# Patient Record
Sex: MALE | Race: WHITE | NOT HISPANIC OR LATINO
[De-identification: names, ages, dates, MRNs, and addresses within clinical notes are randomized per-mention and may not be internally consistent; named-entity substitution may affect disease eponyms.]

---

## 2023-03-21 ENCOUNTER — NON-APPOINTMENT (OUTPATIENT)
Age: 73
End: 2023-03-21

## 2023-03-21 ENCOUNTER — APPOINTMENT (OUTPATIENT)
Dept: INTERNAL MEDICINE | Facility: CLINIC | Age: 73
End: 2023-03-21
Payer: MEDICARE

## 2023-03-21 VITALS — WEIGHT: 195 LBS | HEIGHT: 69 IN | BODY MASS INDEX: 28.88 KG/M2

## 2023-03-21 VITALS
DIASTOLIC BLOOD PRESSURE: 100 MMHG | OXYGEN SATURATION: 98 % | RESPIRATION RATE: 14 BRPM | HEART RATE: 82 BPM | SYSTOLIC BLOOD PRESSURE: 180 MMHG | TEMPERATURE: 98.1 F

## 2023-03-21 DIAGNOSIS — R06.00 DYSPNEA, UNSPECIFIED: ICD-10-CM

## 2023-03-21 PROCEDURE — 99204 OFFICE O/P NEW MOD 45 MIN: CPT | Mod: 25

## 2023-03-21 PROCEDURE — 93000 ELECTROCARDIOGRAM COMPLETE: CPT | Mod: 59

## 2023-03-21 NOTE — PHYSICAL EXAM
[No Acute Distress] : no acute distress [Well Nourished] : well nourished [Normal Outer Ear/Nose] : the outer ears and nose were normal in appearance [No JVD] : no jugular venous distention [No Respiratory Distress] : no respiratory distress  [No Accessory Muscle Use] : no accessory muscle use [Normal Gait] : normal gait [Normal] : affect was normal and insight and judgment were intact [de-identified] : reduced breath sounds on bilateral lower lung fields auscultated posteriorly [de-identified] : 2+ pitting edema bilateral lower extremities

## 2023-03-21 NOTE — HISTORY OF PRESENT ILLNESS
[FreeTextEntry8] : ROSMERY MOORE is a 72 year old male who presents for new patient focused evaluation, c/o shortness of breath.\par He is accompanied by his wife. \par His last evaluation with a physician was ~ 10  yrs ago, but states he has meds filled from someone (her prefers not to disclose) for his chronic conditions. Last labs ~10 yrs ago.\par H/o Diabetes Mellitus 2, diagnosed ~ 20 yrs ago, on Glyburide/Metformin 5/500 2 pills daily.\par H/o Anxiety--on Sertraline 50 mg daily and Wellbutrin ? dose daily\par Over the past 2-3 weeks has had progressive shortness of breath--even walking across the room gets out of breath. In the same time period he also notes his legs have both become swollen/heavy and can no longer lie on his back (works as a , can no longer lie on his back for work). He denies symptoms of chest pain and has no apparent h/o heart disease. \par Remote h/o smoking--1-2 ppd x 20 yrs, quit 30 yrs ago.

## 2023-03-21 NOTE — PLAN
[FreeTextEntry1] : Dyspnea on exertion, abnormal EKG, Hypertensive urgency: exam and details as above, counseled patient that I am concerned he is having an acute coronary syndrome and advised him on urgent ER evaluation. He declines ambulance. Provided him with letter documenting finding of exam and recommendation for ER visit; his wife will drive him directly from here to [facility] (his preference).\par \par Advised him once discharged needs to f/u with me.

## 2023-03-21 NOTE — REVIEW OF SYSTEMS
[Fatigue] : fatigue [Postnasal Drip] : postnasal drip [Lower Ext Edema] : lower extremity edema [Orthopena] : orthopnea [Paroxysmal Nocturnal Dyspnea] : paroxysmal nocturnal dyspnea [Shortness Of Breath] : shortness of breath [Dyspnea on Exertion] : dyspnea on exertion [Anxiety] : anxiety [Negative] : Genitourinary [Fever] : no fever [Chills] : no chills [Earache] : no earache [Nosebleeds] : no nosebleeds [Sore Throat] : no sore throat [Hoarseness] : no hoarseness [Chest Pain] : no chest pain [Palpitations] : no palpitations [Claudication] : no  leg claudication [Wheezing] : no wheezing [Headache] : no headache [Unsteady Walk] : no ataxia [Suicidal] : not suicidal [Insomnia] : no insomnia [Depression] : no depression [FreeTextEntry4] : +runny nose  [FreeTextEntry6] : cough at night from "mucous"

## 2023-04-11 ENCOUNTER — APPOINTMENT (OUTPATIENT)
Dept: INTERNAL MEDICINE | Facility: CLINIC | Age: 73
End: 2023-04-11
Payer: MEDICARE

## 2023-04-11 VITALS — DIASTOLIC BLOOD PRESSURE: 70 MMHG | WEIGHT: 175 LBS | BODY MASS INDEX: 25.84 KG/M2 | SYSTOLIC BLOOD PRESSURE: 120 MMHG

## 2023-04-11 DIAGNOSIS — M79.89 OTHER SPECIFIED SOFT TISSUE DISORDERS: ICD-10-CM

## 2023-04-11 DIAGNOSIS — I16.0 HYPERTENSIVE URGENCY: ICD-10-CM

## 2023-04-11 DIAGNOSIS — Z86.79 PERSONAL HISTORY OF OTHER DISEASES OF THE CIRCULATORY SYSTEM: ICD-10-CM

## 2023-04-11 PROCEDURE — 99214 OFFICE O/P EST MOD 30 MIN: CPT | Mod: 25

## 2023-04-11 RX ORDER — BUPROPION HYDROCHLORIDE 100 MG/1
100 TABLET, FILM COATED ORAL
Refills: 0 | Status: DISCONTINUED | COMMUNITY
Start: 2023-03-21 | End: 2023-04-11

## 2023-04-11 RX ORDER — GLYBURIDE AND METFORMIN HYDROCHLORIDE 5; 500 MG/1; MG/1
5-500 TABLET, FILM COATED ORAL
Qty: 180 | Refills: 1 | Status: DISCONTINUED | COMMUNITY
Start: 2023-03-21 | End: 2023-04-11

## 2023-04-11 RX ORDER — SERTRALINE HYDROCHLORIDE 50 MG/1
50 TABLET, FILM COATED ORAL
Refills: 0 | Status: DISCONTINUED | COMMUNITY
Start: 2023-03-21 | End: 2023-04-11

## 2023-04-11 NOTE — HISTORY OF PRESENT ILLNESS
[Post-hospitalization from ___ Hospital] : Post-hospitalization from [unfilled] Hospital [Patient Contacted By: ____] : and contacted by [unfilled] [FreeTextEntry2] : \par Pt was hospitalized for HTN emergency, CHF \par reports angiogram done needed no intervention \par has f/u with cardiologist Dr. Cooper Mireles coming up \par on new meds\par states baseline weight is 176 lbs - weighing himself daily - watching his diet \par \par \par yesterday saw his endocrinologist Dr. Jamal Morales who kaya blood work \par is on Tradjenta\par \par states mood is good - off sertraline and wellbutrin x 2-3 weeks \par \par states had R. angiogram with R. groin hematoma slowly resolving\par \par

## 2023-04-11 NOTE — PHYSICAL EXAM
[No Acute Distress] : no acute distress [Well Nourished] : well nourished [No Accessory Muscle Use] : no accessory muscle use [Clear to Auscultation] : lungs were clear to auscultation bilaterally [Regular Rhythm] : with a regular rhythm [Normal S1, S2] : normal S1 and S2 [No Murmur] : no murmur heard [No Edema] : there was no peripheral edema [Soft] : abdomen soft [Non Tender] : non-tender [Non-distended] : non-distended [No Masses] : no abdominal mass palpated [No HSM] : no HSM [Normal Bowel Sounds] : normal bowel sounds [de-identified] : +abdominal hernia, reducible [de-identified] : resolving R. groin hematoma with echmyosses

## 2023-04-14 ENCOUNTER — APPOINTMENT (OUTPATIENT)
Dept: CARDIOLOGY | Facility: CLINIC | Age: 73
End: 2023-04-14
Payer: MEDICARE

## 2023-04-14 ENCOUNTER — NON-APPOINTMENT (OUTPATIENT)
Age: 73
End: 2023-04-14

## 2023-04-14 VITALS
DIASTOLIC BLOOD PRESSURE: 69 MMHG | HEART RATE: 53 BPM | RESPIRATION RATE: 17 BRPM | WEIGHT: 175 LBS | BODY MASS INDEX: 25.92 KG/M2 | SYSTOLIC BLOOD PRESSURE: 122 MMHG | HEIGHT: 69 IN | OXYGEN SATURATION: 100 %

## 2023-04-14 PROCEDURE — 99204 OFFICE O/P NEW MOD 45 MIN: CPT

## 2023-04-14 PROCEDURE — 93000 ELECTROCARDIOGRAM COMPLETE: CPT

## 2023-04-14 NOTE — REASON FOR VISIT
[Symptom and Test Evaluation] : symptom and test evaluation [Cardiac Failure] : cardiac failure [Other: ____] : [unfilled] [FreeTextEntry3] : Ip

## 2023-04-14 NOTE — ASSESSMENT
[FreeTextEntry1] : New onset congestive heart failure in patient with diabetes, reported nonobstructive CAD.

## 2023-04-14 NOTE — DISCUSSION/SUMMARY
[Patient] : the patient [Risks] : risks [Benefits] : benefits [Alternatives] : alternatives [With Me] : with me [___ Week(s)] : in [unfilled] week(s) [FreeTextEntry1] : Reviewed and discussed with him in detail low-sodium diet Daily weights at home with extra Bumex dosing as needed weight gain more than 3 pounds in a day.  Referral to cardiac rehab.  Blood testing requested.  Records from [facility] requested including echo and angiography results.  Follow-up in 4 to 6 weeks.  Multiple questions addressed with patient. [EKG obtained to assist in diagnosis and management of assessed problem(s)] : EKG obtained to assist in diagnosis and management of assessed problem(s)

## 2023-04-14 NOTE — HISTORY OF PRESENT ILLNESS
[FreeTextEntry1] : History was obtained from patient.  This gentleman indicates he has had diabetes for about 10 years and no prior history of heart disease.  He indicates he saw his physician with about 2 weeks of increasing dyspnea shortness of breath at night and leg swelling.  He was hospitalized at [facility].  Reports having an angiogram with no intervention and believes that his ejection fraction was 40%.\par \par He was discharged on multiple medications which were reviewed.  He is lost about 20 pounds of weight since the hospital stay.  He feels well at this time without chest pain shortness of breath palpitation or edema.  He is now on a strict low-sodium diet.\par \par He gives no history of myocardial infarction angina recent viral infection.  He stopped drinking and smoking 40 years ago.  He is active automobile repair business.

## 2023-04-14 NOTE — PHYSICAL EXAM
[Normal] : clear lung fields, good air entry, no respiratory distress [Soft] : abdomen soft [Non Tender] : non-tender [Edema ___] : edema [unfilled] [No Rash] : no rash [de-identified] : +hernia

## 2023-04-25 ENCOUNTER — RX RENEWAL (OUTPATIENT)
Age: 73
End: 2023-04-25

## 2023-05-16 ENCOUNTER — RX RENEWAL (OUTPATIENT)
Age: 73
End: 2023-05-16

## 2023-05-23 ENCOUNTER — LABORATORY RESULT (OUTPATIENT)
Age: 73
End: 2023-05-23

## 2023-05-23 ENCOUNTER — APPOINTMENT (OUTPATIENT)
Dept: INTERNAL MEDICINE | Facility: CLINIC | Age: 73
End: 2023-05-23
Payer: MEDICARE

## 2023-05-23 VITALS — HEIGHT: 69 IN | WEIGHT: 180 LBS | BODY MASS INDEX: 26.66 KG/M2

## 2023-05-23 VITALS
TEMPERATURE: 98.1 F | RESPIRATION RATE: 14 BRPM | OXYGEN SATURATION: 97 % | HEART RATE: 66 BPM | DIASTOLIC BLOOD PRESSURE: 78 MMHG | SYSTOLIC BLOOD PRESSURE: 138 MMHG

## 2023-05-23 DIAGNOSIS — Z00.00 ENCOUNTER FOR GENERAL ADULT MEDICAL EXAMINATION W/OUT ABNORMAL FINDINGS: ICD-10-CM

## 2023-05-23 DIAGNOSIS — E78.5 HYPERLIPIDEMIA, UNSPECIFIED: ICD-10-CM

## 2023-05-23 DIAGNOSIS — F41.9 ANXIETY DISORDER, UNSPECIFIED: ICD-10-CM

## 2023-05-23 DIAGNOSIS — K46.9 UNSPECIFIED ABDOMINAL HERNIA W/OUT OBSTRUCTION OR GANGRENE: ICD-10-CM

## 2023-05-23 PROCEDURE — G0439: CPT

## 2023-05-23 PROCEDURE — G0444 DEPRESSION SCREEN ANNUAL: CPT | Mod: 59

## 2023-05-23 PROCEDURE — 36415 COLL VENOUS BLD VENIPUNCTURE: CPT

## 2023-05-23 PROCEDURE — 99212 OFFICE O/P EST SF 10 MIN: CPT | Mod: 25

## 2023-05-23 RX ORDER — ALPRAZOLAM 0.5 MG/1
0.5 TABLET ORAL
Qty: 10 | Refills: 0 | Status: ACTIVE | COMMUNITY
Start: 2023-05-23 | End: 1900-01-01

## 2023-05-23 NOTE — PHYSICAL EXAM
[No Axillary Lymphadenopathy] : no axillary lymphadenopathy [Soft] : abdomen soft [No Masses] : no abdominal mass palpated [No HSM] : no HSM [Normal Bowel Sounds] : normal bowel sounds [Declined Rectal Exam] : declined rectal exam [Normal] : affect was normal and insight and judgment were intact [de-identified] : +abdominal hernia--nontender, not fully reducible/+BS.  [de-identified] : no bladder fullness, no testicular masses [de-identified] : scattered hyperpigmented patches,moles on back,chest, arms.

## 2023-05-23 NOTE — HISTORY OF PRESENT ILLNESS
[FreeTextEntry1] : "here for my physical" [de-identified] : ROSMERY MOORE is a 72 year old male who presents for annual comprehensive exam.\par Last seen in March for new patient anisha, was sent to [facility] due to concern for CHF/hypertensive urgency.\par Since then has been following regularly with cardiology (Dr. Mireles), and Endocrinology (Dr. Jamal Morales). Has completely cut out salt, alcohol, and is maintaining low carb diet. Complaint with all prescribed meds.\par Diabetes--on Tradjenta 5 mg, Jardiance 10 mg; fingersticks consistently in 110s per patient. \par CHF--weight has been stable, keeps very active at work (, 5.5 days/week), denies any physical limitations at present. To start cardaic rehab soon.\par h/o Anxiety, sometimes becomes irritable/anxious, has Rx for Xanax from prior PCP, takes ~8-10x/year, requesting renewals. Denies any h/o drug abuse or dependency.

## 2023-05-23 NOTE — PLAN
[FreeTextEntry1] : Labs drawn in office\par #CHF: counseled on diet, exercise, encouraged to schedule cardiac rehab as advised by cardiologist. Meds reconciled, on Entresto, Amlodipine, Lipitor, Coreg, Bumetanide, weight stable. Check lipids, CMPTo f/u with Dr. Mireles.\par #DM II with CKD: labs drawn, on Tradjenta, Jardiance, f/u endo. UTD eye exam, foot exam\par #Anxiety: Xanax renewed, advised will provide once a year Rx, if requests escalating dose, advised needs to see psychiatrist.\par #Abdominal hernia: surgical evaluation--provided contact.\par \par Colonoscopy: referred\par Vaccines: per patient completed SHingrix, Pneumonia, COVID19 with boosters and seasonal Flu--advised to send records.\par \par \par

## 2023-05-23 NOTE — HEALTH RISK ASSESSMENT
[Very Good] : ~his/her~  mood as very good [No] : In the past 12 months have you used drugs other than those required for medical reasons? No [No falls in past year] : Patient reported no falls in the past year [0] : 2) Feeling down, depressed, or hopeless: Not at all (0) [PHQ-2 Negative - No further assessment needed] : PHQ-2 Negative - No further assessment needed [de-identified] : Cards, Endo, ophthalmologist (Dr. Pérez) [de-identified] : walks daily/lifts heavy items at work daily [de-identified] : low salt, low carb, low saturated fat [[id number]] : 0 [HIV test declined] : HIV test declined [Hepatitis C test declined] : Hepatitis C test declined [Change in mental status noted] : No change in mental status noted [Language] : denies difficulty with language [Behavior] : denies difficulty with behavior [Learning/Retaining New Information] : denies difficulty learning/retaining new information [Handling Complex Tasks] : denies difficulty handling complex tasks [Reasoning] : denies difficulty with reasoning [Spatial Ability and Orientation] : denies difficulty with spatial ability and orientation [None] : None [With Family] : lives with family [] :  [Employed] : employed [# Of Children ___] : has [unfilled] children [Feels Safe at Home] : Feels safe at home [High Risk Behavior] : no high risk behavior [Fully functional (bathing, dressing, toileting, transferring, walking, feeding)] : Fully functional (bathing, dressing, toileting, transferring, walking, feeding) [Fully functional (using the telephone, shopping, preparing meals, housekeeping, doing laundry, using] : Fully functional and needs no help or supervision to perform IADLs (using the telephone, shopping, preparing meals, housekeeping, doing laundry, using transportation, managing medications and managing finances) [Reports changes in hearing] : Reports no changes in hearing [Reports changes in vision] : Reports no changes in vision [Reports normal functional visual acuity (ie: able to read med bottle)] : Reports normal functional visual acuity [Reports changes in dental health] : Reports no changes in dental health [Smoke Detector] : smoke detector [Carbon Monoxide Detector] : carbon monoxide detector [Safety elements used in home] : safety elements used in home [Seat Belt] :  uses seat belt [Sunscreen] : uses sunscreen [TB Exposure] : is not being exposed to tuberculosis [ColonoscopyComments] : overdue--referral to GI provided [FreeTextEntry2] :  [Former] : Former [20 or more] : 20 or more [> 15 Years] : > 15 Years [de-identified] : quit 1 ppd x 20 yrs, quit 35 yrs ago

## 2023-05-26 ENCOUNTER — NON-APPOINTMENT (OUTPATIENT)
Age: 73
End: 2023-05-26

## 2023-05-26 ENCOUNTER — APPOINTMENT (OUTPATIENT)
Dept: CARDIOLOGY | Facility: CLINIC | Age: 73
End: 2023-05-26
Payer: MEDICARE

## 2023-05-26 VITALS
RESPIRATION RATE: 19 BRPM | HEIGHT: 69 IN | HEART RATE: 55 BPM | OXYGEN SATURATION: 100 % | TEMPERATURE: 94.3 F | DIASTOLIC BLOOD PRESSURE: 78 MMHG | SYSTOLIC BLOOD PRESSURE: 159 MMHG | BODY MASS INDEX: 27.25 KG/M2 | WEIGHT: 184 LBS

## 2023-05-26 DIAGNOSIS — I25.10 ATHEROSCLEROTIC HEART DISEASE OF NATIVE CORONARY ARTERY W/OUT ANGINA PECTORIS: ICD-10-CM

## 2023-05-26 PROCEDURE — 99214 OFFICE O/P EST MOD 30 MIN: CPT

## 2023-05-26 PROCEDURE — 93000 ELECTROCARDIOGRAM COMPLETE: CPT

## 2023-05-26 RX ORDER — BUMETANIDE 0.5 MG/1
0.5 TABLET ORAL
Qty: 180 | Refills: 1 | Status: DISCONTINUED | COMMUNITY
Start: 2023-04-11 | End: 2023-05-26

## 2023-05-26 NOTE — DISCUSSION/SUMMARY
[Patient] : the patient [Risks] : risks [Benefits] : benefits [Alternatives] : alternatives [With Me] : with me [___ Week(s)] : in [unfilled] week(s) [FreeTextEntry1] : Patient counseled verbally and in writing regarding low sodium diet, handout given.\par Recommended diet and weight loss, regular exericse.\par Daily weights with extra Bumex for weight gain more than 3 pounds 1 day 5 pounds 1 week.\par \par Reduction of daily Bumex dose given rising creatinine.  Lab work ordered prior to next visit repeat echo at next visit to reassess LV function.  Questions addressed with patient.

## 2023-05-26 NOTE — PHYSICAL EXAM
[Normal] : clear lung fields, good air entry, no respiratory distress [Soft] : abdomen soft [Non Tender] : non-tender [Edema ___] : edema [unfilled] [No Rash] : no rash [de-identified] : +hernia

## 2023-05-26 NOTE — CARDIOLOGY SUMMARY
[de-identified] : April 14, 2023 sinus rhythm poor R progression abnormal ST-T appearance\par May 26, 2023 sinus rhythm poor progression abnormal ST-T appearance [de-identified] : April 2023 EF 30% [de-identified] : March 2023 severe disease of dominant RCA otherwise nonobstructive disease

## 2023-05-26 NOTE — ASSESSMENT
[FreeTextEntry1] : New onset congestive heart failure in patient with diabetes, reported nonobstructive CAD. \par Severe LV dysfunction by hospital echo.  CKD.  Diabetes.  Hypertension.

## 2023-05-26 NOTE — REASON FOR VISIT
[Symptom and Test Evaluation] : symptom and test evaluation [Cardiac Failure] : cardiac failure [FreeTextEntry3] : Ip

## 2023-05-26 NOTE — HISTORY OF PRESENT ILLNESS
[FreeTextEntry1] : Follow-up for heart failure with reduced ejection fraction nonischemic cardiomyopathy.

## 2023-06-13 RX ORDER — LINAGLIPTIN 5 MG/1
5 TABLET, FILM COATED ORAL
Qty: 90 | Refills: 1 | Status: ACTIVE | COMMUNITY
Start: 2023-04-11

## 2023-07-28 ENCOUNTER — APPOINTMENT (OUTPATIENT)
Dept: CARDIOLOGY | Facility: CLINIC | Age: 73
End: 2023-07-28
Payer: MEDICARE

## 2023-07-28 ENCOUNTER — NON-APPOINTMENT (OUTPATIENT)
Age: 73
End: 2023-07-28

## 2023-07-28 VITALS
OXYGEN SATURATION: 96 % | SYSTOLIC BLOOD PRESSURE: 136 MMHG | DIASTOLIC BLOOD PRESSURE: 79 MMHG | WEIGHT: 181 LBS | BODY MASS INDEX: 26.81 KG/M2 | HEIGHT: 69 IN | HEART RATE: 56 BPM

## 2023-07-28 DIAGNOSIS — Z78.9 OTHER SPECIFIED HEALTH STATUS: ICD-10-CM

## 2023-07-28 PROCEDURE — 99214 OFFICE O/P EST MOD 30 MIN: CPT

## 2023-07-28 PROCEDURE — 93000 ELECTROCARDIOGRAM COMPLETE: CPT

## 2023-07-28 PROCEDURE — 93306 TTE W/DOPPLER COMPLETE: CPT

## 2023-07-28 RX ORDER — SACUBITRIL AND VALSARTAN 49; 51 MG/1; MG/1
49-51 TABLET, FILM COATED ORAL
Qty: 180 | Refills: 3 | Status: DISCONTINUED | COMMUNITY
Start: 2023-04-11 | End: 2023-07-28

## 2023-07-28 NOTE — PHYSICAL EXAM
[Normal] : clear lung fields, good air entry, no respiratory distress [Soft] : abdomen soft [Non Tender] : non-tender [No Edema] : no edema [Edema ___] : edema [unfilled] [No Rash] : no rash [de-identified] : +hernia

## 2023-07-28 NOTE — DISCUSSION/SUMMARY
[Patient] : the patient [Risks] : risks [Benefits] : benefits [Alternatives] : alternatives [With Me] : with me [___ Week(s)] : in [unfilled] week(s) [FreeTextEntry1] : Will DC daily Bumex and use only as needed.  Dosing of Entresto will be reduced as well.  Nephrology evaluation recommended repeat blood testing ordered discussed in detail with patient.  Follow-up 4 to 6 weeks [EKG obtained to assist in diagnosis and management of assessed problem(s)] : EKG obtained to assist in diagnosis and management of assessed problem(s)

## 2023-07-28 NOTE — HISTORY OF PRESENT ILLNESS
[FreeTextEntry1] : Follow-up for heart failure with reduced ejection fraction nonischemic cardiomyopathy.\par \par No chest pain palpitations or edema.  Some fatigue after walking 3 blocks.\par \par

## 2023-07-28 NOTE — ASSESSMENT
[FreeTextEntry1] : Substantial improvement with normalization of LV function today by echo.  Heart failure with improved ejection fraction.  Chronic kidney disease.  Diabetes.

## 2023-07-28 NOTE — CARDIOLOGY SUMMARY
[de-identified] : April 14, 2023 sinus rhythm poor R progression abnormal ST-T appearance\par May 26, 2023 sinus rhythm poor progression abnormal ST-T appearance\par July 28, 2023 sinus rhythm nonspecific ST-T change [de-identified] : April 2023 EF 30%\par July 28, 2023 normal LVEF mild AI [de-identified] : March 2023 severe disease of dominant RCA otherwise nonobstructive disease

## 2023-08-02 ENCOUNTER — APPOINTMENT (OUTPATIENT)
Dept: NEPHROLOGY | Facility: CLINIC | Age: 73
End: 2023-08-02
Payer: MEDICARE

## 2023-08-02 VITALS
SYSTOLIC BLOOD PRESSURE: 136 MMHG | DIASTOLIC BLOOD PRESSURE: 70 MMHG | HEIGHT: 69 IN | BODY MASS INDEX: 25.48 KG/M2 | HEART RATE: 60 BPM | WEIGHT: 172 LBS

## 2023-08-02 DIAGNOSIS — Z83.3 FAMILY HISTORY OF DIABETES MELLITUS: ICD-10-CM

## 2023-08-02 DIAGNOSIS — R80.8 OTHER PROTEINURIA: ICD-10-CM

## 2023-08-02 DIAGNOSIS — N18.30 CHRONIC KIDNEY DISEASE, STAGE 3 UNSPECIFIED: ICD-10-CM

## 2023-08-02 DIAGNOSIS — I50.20 UNSPECIFIED SYSTOLIC (CONGESTIVE) HEART FAILURE: ICD-10-CM

## 2023-08-02 PROCEDURE — 99205 OFFICE O/P NEW HI 60 MIN: CPT

## 2023-08-02 NOTE — HISTORY OF PRESENT ILLNESS
[FreeTextEntry1] : 72-year-old male with history of diabetes, hypertension, systolic CHF, here to establish renal care for CRISPIN on CKD 3B. Patient had been admitted to [facility] from March 21 to March 24.  Found to have decompensated systolic CHF.  Diuresis, had angiogram done with no intervention, had renal sonogram done and creatinine was 2.0 at that time. Ultrasound from March 22 showed right kidney 10.9 cm, left kidney 11 cm, distended bladder, trace upper pole fullness with no hydro. Patient had followed up with cardiologist Dr. Cooper Mireles.  Had been on Bumex as well as Entresto along with Jardiance.  Creatinine in May shows 2.9.  Repeat echocardiogram showed improvement in systolic ejection fraction.  Entresto was decreased as well as taken off bumetanide. Patient overall has less edema.  Reportedly lost about 20 pounds. He is also eating less.  He notices edema progressive during the daytime hours.  In the morning it is normal.  He is on amlodipine 10 mg daily. He was taking Aleve regularly for his back pain.  Now is taking it twice a week. Diet: Drinks dark soda. Eating less red meat and more chicken, vegetables, and fruits. No family history of kidney disease. Follows with Dr. Jamal Morales for his diabetes which has been relatively controlled.  He has retinopathy from diabetes.  Denies neuropathy. He has some symptoms of long COVID with no taste and no smell.  He first had COVID in the beginning of the pandemic and 2 subsequent times since. He is  with children.  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

## 2023-08-02 NOTE — ASSESSMENT
[FreeTextEntry1] : 72-year-old male with history of diabetes, hypertension, systolic CHF, CKD stage IIIb with CRISPIN. Presumably patient has history of CKD stage IIIb.  Possibly in the setting of diabetic and hypertensive nephropathy, with component of cardiorenal syndrome from decompensated CHF. Creatinine in the 2 range in March at [facility]. More recently in May creatinine up to 2.9.  Patient with recent med adjustments which I agree with including decreasing the Entresto dose and stopping the bumetanide for now.  He was asked by Dr. Mireles to monitor his weights and take as needed. Patient now is extremely salt conscious and does not add any more salt in foods.  He has changed his diet as well. We will repeat his renal function today for baseline. We will also screen for hepatitis B, C, free light chains. Proteinuria: Trend albumin creatinine ratio.  Proteinuria likely in the setting of diabetic nephropathy. Cardiorenal syndrome: Recent echo shows improvement in his ejection fraction.  On decreased Entresto dosing.  Continue to monitor with cardiology. Bone metabolism: We will check vitamin D and PTH intact.  Likely will need to go back on vitamin D therapy. Anemia: He is anemic.  We will check iron studies.  We will check free light chains. Patient was advised to avoid chronic NSAIDs. Diabetes: Check A1c.  And trend proteinuria on Jardiance. All questions were answered.  Follow-up in 3 months time.  60 minutes have been spent reviewing medical history, reviewing patient's medical chart including prior lab results, imaging reports, interviewing and examining patient, formulating a detailed plan with patient, documentation and coordinating care with referring physician.

## 2023-08-02 NOTE — PHYSICAL EXAM
[General Appearance - Alert] : alert [General Appearance - In No Acute Distress] : in no acute distress [Sclera] : the sclera and conjunctiva were normal [Outer Ear] : the ears and nose were normal in appearance [Neck Appearance] : the appearance of the neck was normal [Neck Cervical Mass (___cm)] : no neck mass was observed [Respiration, Rhythm And Depth] : normal respiratory rhythm and effort [Auscultation Breath Sounds / Voice Sounds] : lungs were clear to auscultation bilaterally [Heart Rate And Rhythm] : heart rate was normal and rhythm regular [Heart Sounds] : normal S1 and S2 [Heart Sounds Pericardial Friction Rub] : no pericardial rub [Arterial Pulses Carotid] : carotid pulses were normal with no bruits [Bowel Sounds] : normal bowel sounds [Abdomen Soft] : soft [Abdomen Tenderness] : non-tender [Involuntary Movements] : no involuntary movements were seen [] : no rash [No Focal Deficits] : no focal deficits [Oriented To Time, Place, And Person] : oriented to person, place, and time [Affect] : the affect was normal [Mood] : the mood was normal [FreeTextEntry1] : 1+ edema of ankles worsened during the day.

## 2023-08-02 NOTE — CONSULT LETTER
[Dear  ___] : Dear  [unfilled], [Consult Letter:] : I had the pleasure of evaluating your patient, [unfilled]. [( Thank you for referring [unfilled] for consultation for _____ )] : Thank you for referring [unfilled] for consultation for [unfilled] [Please see my note below.] : Please see my note below. [Consult Closing:] : Thank you very much for allowing me to participate in the care of this patient.  If you have any questions, please do not hesitate to contact me. [Sincerely,] : Sincerely, [DrMaryellen  ___] : Dr. CAMPBELL [DrMaryellen ___] : Dr. CAMPBELL [FreeTextEntry3] : Charlotte Bustillos MD  [facility] Division of Nephrology and Hypertension

## 2023-08-03 DIAGNOSIS — E87.5 HYPERKALEMIA: ICD-10-CM

## 2023-08-03 RX ORDER — SODIUM ZIRCONIUM CYCLOSILICATE 10 G/10G
10 POWDER, FOR SUSPENSION ORAL
Qty: 30 | Refills: 11 | Status: ACTIVE | COMMUNITY
Start: 2023-08-03 | End: 1900-01-01

## 2023-08-07 LAB
25(OH)D3 SERPL-MCNC: 16.4 NG/ML
ALBUMIN SERPL ELPH-MCNC: 3.9 G/DL
ALP BLD-CCNC: 100 U/L
ALT SERPL-CCNC: 11 U/L
ANION GAP SERPL CALC-SCNC: 9 MMOL/L
APPEARANCE: CLEAR
AST SERPL-CCNC: 11 U/L
BACTERIA: NEGATIVE /HPF
BILIRUB SERPL-MCNC: 0.5 MG/DL
BILIRUBIN URINE: NEGATIVE
BLOOD URINE: NEGATIVE
BUN SERPL-MCNC: 65 MG/DL
CALCIUM SERPL-MCNC: 9 MG/DL
CALCIUM SERPL-MCNC: 9 MG/DL
CAST: 1 /LPF
CHLORIDE SERPL-SCNC: 113 MMOL/L
CO2 SERPL-SCNC: 19 MMOL/L
COLOR: YELLOW
CREAT SERPL-MCNC: 3.18 MG/DL
CREAT SPEC-SCNC: 178 MG/DL
DEPRECATED KAPPA LC FREE/LAMBDA SER: 1.91 RATIO
EGFR: 20 ML/MIN/1.73M2
EPITHELIAL CELLS: 0 /HPF
ESTIMATED AVERAGE GLUCOSE: 126 MG/DL
FERRITIN SERPL-MCNC: 310 NG/ML
FOLATE SERPL-MCNC: 11.8 NG/ML
GLUCOSE QUALITATIVE U: 250 MG/DL
GLUCOSE SERPL-MCNC: 104 MG/DL
HBA1C MFR BLD HPLC: 6 %
HBV CORE IGG+IGM SER QL: NONREACTIVE
HBV SURFACE AB SER QL: NONREACTIVE
HBV SURFACE AG SER QL: NONREACTIVE
HCV AB SER QL: NONREACTIVE
HCV S/CO RATIO: 0.1 S/CO
IRON SATN MFR SERPL: 26 %
IRON SERPL-MCNC: 57 UG/DL
KAPPA LC CSF-MCNC: 5.32 MG/DL
KAPPA LC SERPL-MCNC: 10.17 MG/DL
KETONES URINE: NEGATIVE MG/DL
LEUKOCYTE ESTERASE URINE: NEGATIVE
M PROTEIN SPEC IFE-MCNC: NORMAL
MAGNESIUM SERPL-MCNC: 2.4 MG/DL
MICROALBUMIN 24H UR DL<=1MG/L-MCNC: 83.8 MG/DL
MICROALBUMIN/CREAT 24H UR-RTO: 470 MG/G
MICROSCOPIC-UA: NORMAL
NITRITE URINE: NEGATIVE
PARATHYROID HORMONE INTACT: 109 PG/ML
PH URINE: 5.5
PHOSPHATE SERPL-MCNC: 4 MG/DL
POTASSIUM SERPL-SCNC: 6.3 MMOL/L
PROT SERPL-MCNC: 6.7 G/DL
PROTEIN URINE: 300 MG/DL
RED BLOOD CELLS URINE: 0 /HPF
SODIUM SERPL-SCNC: 141 MMOL/L
SPECIFIC GRAVITY URINE: 1.02
TIBC SERPL-MCNC: 214 UG/DL
UIBC SERPL-MCNC: 158 UG/DL
URATE SERPL-MCNC: 8.9 MG/DL
UROBILINOGEN URINE: 0.2 MG/DL
VIT B12 SERPL-MCNC: 564 PG/ML
WHITE BLOOD CELLS URINE: 0 /HPF

## 2023-08-08 LAB
ALBUMIN SERPL ELPH-MCNC: 3.7 G/DL
ANION GAP SERPL CALC-SCNC: 11 MMOL/L
BUN SERPL-MCNC: 48 MG/DL
CALCIUM SERPL-MCNC: 8.2 MG/DL
CHLORIDE SERPL-SCNC: 114 MMOL/L
CO2 SERPL-SCNC: 17 MMOL/L
CREAT SERPL-MCNC: 2.59 MG/DL
EGFR: 26 ML/MIN/1.73M2
GLUCOSE SERPL-MCNC: 127 MG/DL
PHOSPHATE SERPL-MCNC: 3.4 MG/DL
POTASSIUM SERPL-SCNC: 5 MMOL/L
SODIUM SERPL-SCNC: 143 MMOL/L

## 2023-08-08 RX ORDER — BUMETANIDE 0.5 MG/1
0.5 TABLET ORAL
Qty: 90 | Refills: 1 | Status: DISCONTINUED | COMMUNITY
Start: 2023-05-26 | End: 2023-08-08

## 2023-08-08 RX ORDER — SACUBITRIL AND VALSARTAN 24; 26 MG/1; MG/1
24-26 TABLET, FILM COATED ORAL
Qty: 180 | Refills: 2 | Status: DISCONTINUED | COMMUNITY
Start: 2023-07-28 | End: 2023-08-08

## 2023-08-31 ENCOUNTER — APPOINTMENT (OUTPATIENT)
Dept: CARDIOLOGY | Facility: CLINIC | Age: 73
End: 2023-08-31
Payer: MEDICARE

## 2023-08-31 ENCOUNTER — NON-APPOINTMENT (OUTPATIENT)
Age: 73
End: 2023-08-31

## 2023-08-31 VITALS
OXYGEN SATURATION: 97 % | WEIGHT: 184 LBS | HEIGHT: 69 IN | BODY MASS INDEX: 27.25 KG/M2 | TEMPERATURE: 94.2 F | HEART RATE: 60 BPM | RESPIRATION RATE: 18 BRPM | SYSTOLIC BLOOD PRESSURE: 160 MMHG | DIASTOLIC BLOOD PRESSURE: 90 MMHG

## 2023-08-31 PROCEDURE — 93000 ELECTROCARDIOGRAM COMPLETE: CPT

## 2023-08-31 PROCEDURE — 99214 OFFICE O/P EST MOD 30 MIN: CPT

## 2023-08-31 RX ORDER — ISOSORBIDE MONONITRATE 30 MG/1
30 TABLET, EXTENDED RELEASE ORAL
Qty: 90 | Refills: 3 | Status: ACTIVE | COMMUNITY
Start: 2023-08-31 | End: 1900-01-01

## 2023-08-31 NOTE — REASON FOR VISIT
[Symptom and Test Evaluation] : symptom and test evaluation [Cardiac Failure] : cardiac failure [Hypertension] : hypertension [FreeTextEntry3] : Ip

## 2023-08-31 NOTE — REVIEW OF SYSTEMS
[Weight Gain (___ Lbs)] : [unfilled] ~Ulb weight gain [Weight Loss (___ Lbs)] : no recent weight loss [Under Stress] : under stress

## 2023-08-31 NOTE — CARDIOLOGY SUMMARY
[de-identified] : April 14, 2023 sinus rhythm poor R progression abnormal ST-T appearance\par  May 26, 2023 sinus rhythm poor progression abnormal ST-T appearance [de-identified] : April 2023 EF 30% [de-identified] : March 2023 severe disease of dominant RCA otherwise nonobstructive disease

## 2023-08-31 NOTE — HISTORY OF PRESENT ILLNESS
[FreeTextEntry1] : Follow-up for heart failure with reduced ejection fraction nonischemic cardiomyopathy.  He has had issues with hyperkalemia and renal insufficiency followed and treated by Dr. Bustillos now.  He claims compliance with medicine but admits to eating more than he should have a lot of salty food.  He denies shortness of breath chest pain palpitations.

## 2023-08-31 NOTE — DISCUSSION/SUMMARY
[Patient] : the patient [Risks] : risks [Benefits] : benefits [Alternatives] : alternatives [With Me] : with me [___ Week(s)] : in [unfilled] week(s) [FreeTextEntry1] :  Loop diuretic until weight has come down to baseline and then use as needed weight gain more than 3 pounds 1 day 4 to 5 pounds in 1 week reviewed this with patient.  Initiate hydralazine and nitrates.  Repeat lab test before next visit in 3 weeks.  Renal follow-up recommended. [EKG obtained to assist in diagnosis and management of assessed problem(s)] : EKG obtained to assist in diagnosis and management of assessed problem(s)

## 2023-08-31 NOTE — PHYSICAL EXAM
[Normal] : clear lung fields, good air entry, no respiratory distress [Soft] : abdomen soft [Non Tender] : non-tender [Edema ___] : edema [unfilled] [No Rash] : no rash [de-identified] : +hernia

## 2023-08-31 NOTE — ASSESSMENT
[FreeTextEntry1] : Recent onset congestive heart failure in patient with diabetes, reported nonobstructive CAD.  Severe LV dysfunction by hospital echo with improved LV function in office echo..  CKD.  Diabetes.  Hypertension.  Hyperkalemia.  Heart failure regimen medications limited by his CKD and hyperkalemia

## 2023-09-29 ENCOUNTER — APPOINTMENT (OUTPATIENT)
Dept: CARDIOLOGY | Facility: CLINIC | Age: 73
End: 2023-09-29

## 2023-11-07 ENCOUNTER — APPOINTMENT (OUTPATIENT)
Dept: NEPHROLOGY | Facility: CLINIC | Age: 73
End: 2023-11-07
Payer: MEDICARE

## 2023-11-07 ENCOUNTER — LABORATORY RESULT (OUTPATIENT)
Age: 73
End: 2023-11-07

## 2023-11-07 VITALS
WEIGHT: 180 LBS | HEART RATE: 64 BPM | OXYGEN SATURATION: 97 % | TEMPERATURE: 98.7 F | HEIGHT: 69 IN | BODY MASS INDEX: 26.66 KG/M2 | SYSTOLIC BLOOD PRESSURE: 172 MMHG | DIASTOLIC BLOOD PRESSURE: 76 MMHG

## 2023-11-07 DIAGNOSIS — Z88.9 ALLERGY STATUS TO UNSPECIFIED DRUGS, MEDICAMENTS AND BIOLOGICAL SUBSTANCES: ICD-10-CM

## 2023-11-07 PROCEDURE — 99214 OFFICE O/P EST MOD 30 MIN: CPT

## 2023-11-09 LAB
ALBUMIN SERPL ELPH-MCNC: 3.5 G/DL
ALP BLD-CCNC: 117 U/L
ALT SERPL-CCNC: 14 U/L
ANION GAP SERPL CALC-SCNC: 14 MMOL/L
APPEARANCE: CLEAR
AST SERPL-CCNC: 13 U/L
BACTERIA: NEGATIVE /HPF
BASOPHILS # BLD AUTO: 0.03 K/UL
BASOPHILS NFR BLD AUTO: 0.6 %
BILIRUB SERPL-MCNC: 0.3 MG/DL
BILIRUBIN URINE: NEGATIVE
BLOOD URINE: ABNORMAL
BUN SERPL-MCNC: 56 MG/DL
C3 SERPL-MCNC: 114 MG/DL
C4 SERPL-MCNC: 34 MG/DL
CALCIUM SERPL-MCNC: 8.3 MG/DL
CAST: 1 /LPF
CHLORIDE SERPL-SCNC: 109 MMOL/L
CO2 SERPL-SCNC: 19 MMOL/L
COLOR: YELLOW
CREAT SERPL-MCNC: 3.24 MG/DL
CREAT SPEC-SCNC: 58 MG/DL
EGFR: 20 ML/MIN/1.73M2
EOSINOPHIL # BLD AUTO: 0.22 K/UL
EOSINOPHIL NFR BLD AUTO: 4.1 %
EPITHELIAL CELLS: 0 /HPF
ESTIMATED AVERAGE GLUCOSE: 120 MG/DL
FERRITIN SERPL-MCNC: 288 NG/ML
GLUCOSE QUALITATIVE U: 100 MG/DL
GLUCOSE SERPL-MCNC: 199 MG/DL
HBA1C MFR BLD HPLC: 5.8 %
HCT VFR BLD CALC: 28.4 %
HGB BLD-MCNC: 9.3 G/DL
IMM GRANULOCYTES NFR BLD AUTO: 0 %
IRON SATN MFR SERPL: 30 %
IRON SERPL-MCNC: 67 UG/DL
KETONES URINE: NEGATIVE MG/DL
LEUKOCYTE ESTERASE URINE: NEGATIVE
LYMPHOCYTES # BLD AUTO: 0.95 K/UL
LYMPHOCYTES NFR BLD AUTO: 17.8 %
MAGNESIUM SERPL-MCNC: 1.9 MG/DL
MAN DIFF?: NORMAL
MCHC RBC-ENTMCNC: 31.5 PG
MCHC RBC-ENTMCNC: 32.7 GM/DL
MCV RBC AUTO: 96.3 FL
MICROALBUMIN 24H UR DL<=1MG/L-MCNC: 161 MG/DL
MICROALBUMIN/CREAT 24H UR-RTO: 2756 MG/G
MICROSCOPIC-UA: NORMAL
MONOCYTES # BLD AUTO: 0.36 K/UL
MONOCYTES NFR BLD AUTO: 6.7 %
NEUTROPHILS # BLD AUTO: 3.78 K/UL
NEUTROPHILS NFR BLD AUTO: 70.8 %
NITRITE URINE: NEGATIVE
PH URINE: 6
PHOSPHATE SERPL-MCNC: 3.5 MG/DL
PLATELET # BLD AUTO: 144 K/UL
POTASSIUM SERPL-SCNC: 5 MMOL/L
PROT SERPL-MCNC: 5.9 G/DL
PROTEIN URINE: 300 MG/DL
RBC # BLD: 2.95 M/UL
RBC # FLD: 13.7 %
RED BLOOD CELLS URINE: 1 /HPF
SODIUM SERPL-SCNC: 142 MMOL/L
SPECIFIC GRAVITY URINE: 1.01
TIBC SERPL-MCNC: 225 UG/DL
UIBC SERPL-MCNC: 158 UG/DL
URATE SERPL-MCNC: 9.1 MG/DL
UROBILINOGEN URINE: 0.2 MG/DL
WBC # FLD AUTO: 5.34 K/UL
WHITE BLOOD CELLS URINE: 0 /HPF

## 2023-11-09 RX ORDER — HYDRALAZINE HYDROCHLORIDE 10 MG/1
10 TABLET ORAL 3 TIMES DAILY
Qty: 90 | Refills: 3 | Status: DISCONTINUED | COMMUNITY
Start: 2023-08-31 | End: 2023-11-09

## 2023-11-10 ENCOUNTER — NON-APPOINTMENT (OUTPATIENT)
Age: 73
End: 2023-11-10

## 2023-11-10 LAB
ANA SER IF-ACNC: NEGATIVE
HISTONE AB SER QL: 0.8 UNITS

## 2023-11-11 LAB — DSDNA AB SER-ACNC: <12 IU/ML

## 2023-11-16 ENCOUNTER — APPOINTMENT (OUTPATIENT)
Dept: NEPHROLOGY | Facility: CLINIC | Age: 73
End: 2023-11-16
Payer: MEDICARE

## 2023-11-16 VITALS
OXYGEN SATURATION: 98 % | WEIGHT: 178 LBS | HEART RATE: 58 BPM | BODY MASS INDEX: 26.36 KG/M2 | HEIGHT: 69 IN | SYSTOLIC BLOOD PRESSURE: 149 MMHG | DIASTOLIC BLOOD PRESSURE: 67 MMHG | TEMPERATURE: 97.5 F

## 2023-11-16 VITALS — DIASTOLIC BLOOD PRESSURE: 64 MMHG | SYSTOLIC BLOOD PRESSURE: 132 MMHG

## 2023-11-16 PROCEDURE — 99213 OFFICE O/P EST LOW 20 MIN: CPT | Mod: 25

## 2023-11-16 PROCEDURE — 96372 THER/PROPH/DIAG INJ SC/IM: CPT

## 2023-11-16 RX ORDER — DARBEPOETIN ALFA 40 UG/ML
40 SOLUTION INTRAVENOUS; SUBCUTANEOUS
Qty: 0 | Refills: 0 | Status: COMPLETED | OUTPATIENT
Start: 2023-11-16

## 2023-11-16 RX ADMIN — DARBEPOETIN ALFA 1 MCG/ML: 40 SOLUTION INTRAVENOUS; SUBCUTANEOUS at 00:00

## 2023-11-24 ENCOUNTER — NON-APPOINTMENT (OUTPATIENT)
Age: 73
End: 2023-11-24

## 2023-11-28 ENCOUNTER — RX RENEWAL (OUTPATIENT)
Age: 73
End: 2023-11-28

## 2023-11-28 RX ORDER — ATORVASTATIN CALCIUM 80 MG/1
80 TABLET, FILM COATED ORAL
Qty: 90 | Refills: 3 | Status: ACTIVE | COMMUNITY
Start: 2023-04-11 | End: 1900-01-01

## 2023-11-28 RX ORDER — AMLODIPINE BESYLATE 10 MG/1
10 TABLET ORAL
Qty: 90 | Refills: 3 | Status: ACTIVE | COMMUNITY
Start: 2023-04-11 | End: 1900-01-01

## 2023-11-28 RX ORDER — CARVEDILOL 25 MG/1
25 TABLET, FILM COATED ORAL
Qty: 180 | Refills: 3 | Status: ACTIVE | COMMUNITY
Start: 2023-04-11 | End: 1900-01-01

## 2023-11-29 LAB
25(OH)D3 SERPL-MCNC: 17.1 NG/ML
ALBUMIN SERPL ELPH-MCNC: 3.8 G/DL
ALP BLD-CCNC: 120 U/L
ALT SERPL-CCNC: 13 U/L
ANION GAP SERPL CALC-SCNC: 14 MMOL/L
APPEARANCE: CLEAR
AST SERPL-CCNC: 12 U/L
BILIRUB SERPL-MCNC: 0.2 MG/DL
BILIRUBIN URINE: NEGATIVE
BLOOD URINE: NEGATIVE
BUN SERPL-MCNC: 53 MG/DL
CALCIUM SERPL-MCNC: 8.3 MG/DL
CHLORIDE SERPL-SCNC: 111 MMOL/L
CHOLEST SERPL-MCNC: 94 MG/DL
CO2 SERPL-SCNC: 20 MMOL/L
COLOR: YELLOW
CREAT SERPL-MCNC: 2.89 MG/DL
CREAT SPEC-SCNC: 49 MG/DL
CREAT/PROT UR: 1.2 RATIO
EGFR: 23 ML/MIN/1.73M2
ESTIMATED AVERAGE GLUCOSE: 137 MG/DL
FRUCTOSAMINE SERPL-MCNC: 326 UMOL/L
GLUCOSE QUALITATIVE U: >=1000 MG/DL
GLUCOSE SERPL-MCNC: 160 MG/DL
HBA1C MFR BLD HPLC: 6.4 %
HDLC SERPL-MCNC: 39 MG/DL
KETONES URINE: NEGATIVE MG/DL
LDLC SERPL CALC-MCNC: 46 MG/DL
LEUKOCYTE ESTERASE URINE: NEGATIVE
NITRITE URINE: NEGATIVE
NONHDLC SERPL-MCNC: 54 MG/DL
PH URINE: 5.5
POTASSIUM SERPL-SCNC: 5 MMOL/L
PROT SERPL-MCNC: 6.7 G/DL
PROT UR-MCNC: 57 MG/DL
PROTEIN URINE: 100 MG/DL
PSA SERPL-MCNC: 1.74 NG/ML
SODIUM SERPL-SCNC: 144 MMOL/L
SPECIFIC GRAVITY URINE: 1.02
TRIGL SERPL-MCNC: 42 MG/DL
TSH SERPL-ACNC: 2.24 UIU/ML
UROBILINOGEN URINE: 0.2 MG/DL

## 2024-02-25 ENCOUNTER — RX RENEWAL (OUTPATIENT)
Age: 74
End: 2024-02-25

## 2024-03-03 ENCOUNTER — NON-APPOINTMENT (OUTPATIENT)
Age: 74
End: 2024-03-03

## 2024-04-09 ENCOUNTER — APPOINTMENT (OUTPATIENT)
Dept: INTERNAL MEDICINE | Facility: CLINIC | Age: 74
End: 2024-04-09
Payer: MEDICARE

## 2024-04-09 VITALS
HEIGHT: 69 IN | TEMPERATURE: 98.5 F | DIASTOLIC BLOOD PRESSURE: 60 MMHG | BODY MASS INDEX: 25.33 KG/M2 | SYSTOLIC BLOOD PRESSURE: 140 MMHG | WEIGHT: 171 LBS

## 2024-04-09 DIAGNOSIS — I10 ESSENTIAL (PRIMARY) HYPERTENSION: ICD-10-CM

## 2024-04-09 DIAGNOSIS — T78.40XA ALLERGY, UNSPECIFIED, INITIAL ENCOUNTER: ICD-10-CM

## 2024-04-09 PROCEDURE — 99214 OFFICE O/P EST MOD 30 MIN: CPT

## 2024-04-09 PROCEDURE — 36415 COLL VENOUS BLD VENIPUNCTURE: CPT

## 2024-04-09 PROCEDURE — G2211 COMPLEX E/M VISIT ADD ON: CPT

## 2024-04-09 RX ORDER — TRIAMCINOLONE ACETONIDE 5 MG/G
0.5 CREAM TOPICAL
Qty: 60 | Refills: 1 | Status: ACTIVE | COMMUNITY
Start: 2024-04-09 | End: 1900-01-01

## 2024-04-09 NOTE — PHYSICAL EXAM
[No Acute Distress] : no acute distress [Well Nourished] : well nourished [Normal Affect] : the affect was normal [Normal Insight/Judgement] : insight and judgment were intact [de-identified] : scattered erythematous macules on legs and hives like on posterior back - signs of excoriation

## 2024-04-09 NOTE — HISTORY OF PRESENT ILLNESS
[FreeTextEntry8] : cc: rash   1 month of rash - states coincides with using a new blanket he purchased at [employer] stopped using blanket 1 week ago   states has pruritic rash that initially looked like pustules that started on his legs, also on his back and arm - states "picked at his rash and hard thing came out like plastic" - now rash is flat and with excoriations , no increased warmth - no pain  used OTC hydrocortisone past 3 days with some relief rash and pruritis better  states no changes to his meds   lat BW was ~4 months ago

## 2024-04-10 ENCOUNTER — LABORATORY RESULT (OUTPATIENT)
Age: 74
End: 2024-04-10

## 2024-04-10 LAB
BASOPHILS # BLD AUTO: 0.04 K/UL
BASOPHILS NFR BLD AUTO: 0.5 %
EOSINOPHIL # BLD AUTO: 0.49 K/UL
EOSINOPHIL NFR BLD AUTO: 6.5 %
ESTIMATED AVERAGE GLUCOSE: 111 MG/DL
HBA1C MFR BLD HPLC: 5.5 %
HCT VFR BLD CALC: 25.3 %
HGB BLD-MCNC: 8 G/DL
IMM GRANULOCYTES NFR BLD AUTO: 0.3 %
LYMPHOCYTES # BLD AUTO: 0.78 K/UL
LYMPHOCYTES NFR BLD AUTO: 10.3 %
MAN DIFF?: NORMAL
MCHC RBC-ENTMCNC: 31.6 GM/DL
MCHC RBC-ENTMCNC: 31.6 PG
MCV RBC AUTO: 100 FL
MONOCYTES # BLD AUTO: 0.43 K/UL
MONOCYTES NFR BLD AUTO: 5.7 %
NEUTROPHILS # BLD AUTO: 5.81 K/UL
NEUTROPHILS NFR BLD AUTO: 76.7 %
PLATELET # BLD AUTO: 148 K/UL
RBC # BLD: 2.53 M/UL
RBC # FLD: 13.4 %
WBC # FLD AUTO: 7.57 K/UL

## 2024-04-16 DIAGNOSIS — E53.8 DEFICIENCY OF OTHER SPECIFIED B GROUP VITAMINS: ICD-10-CM

## 2024-04-16 LAB
ALBUMIN SERPL ELPH-MCNC: 3.7 G/DL
ALP BLD-CCNC: 114 U/L
ALT SERPL-CCNC: 10 U/L
ANION GAP SERPL CALC-SCNC: 16 MMOL/L
APPEARANCE: CLEAR
AST SERPL-CCNC: 12 U/L
BILIRUB SERPL-MCNC: 0.3 MG/DL
BILIRUBIN URINE: NEGATIVE
BLOOD URINE: NEGATIVE
BUN SERPL-MCNC: 70 MG/DL
CALCIUM SERPL-MCNC: 8.4 MG/DL
CHLORIDE SERPL-SCNC: 110 MMOL/L
CHOLEST SERPL-MCNC: 112 MG/DL
CO2 SERPL-SCNC: 16 MMOL/L
COLOR: YELLOW
CREAT SERPL-MCNC: 3.65 MG/DL
CREAT SPEC-SCNC: 64 MG/DL
EGFR: 17 ML/MIN/1.73M2
FERRITIN SERPL-MCNC: 355 NG/ML
FOLATE SERPL-MCNC: 4.2 NG/ML
GLUCOSE QUALITATIVE U: 100 MG/DL
GLUCOSE SERPL-MCNC: 148 MG/DL
HDLC SERPL-MCNC: 41 MG/DL
IRON SATN MFR SERPL: 25 %
IRON SERPL-MCNC: 46 UG/DL
KETONES URINE: NEGATIVE MG/DL
LDLC SERPL CALC-MCNC: 59 MG/DL
LEUKOCYTE ESTERASE URINE: NEGATIVE
MICROALBUMIN 24H UR DL<=1MG/L-MCNC: 162 MG/DL
MICROALBUMIN/CREAT 24H UR-RTO: 2529 MG/G
NITRITE URINE: NEGATIVE
NONHDLC SERPL-MCNC: 72 MG/DL
PH URINE: 6.5
POTASSIUM SERPL-SCNC: 5 MMOL/L
PROT SERPL-MCNC: 6.1 G/DL
PROTEIN URINE: 300 MG/DL
PSA SERPL-MCNC: 1.03 NG/ML
SODIUM SERPL-SCNC: 142 MMOL/L
SPECIFIC GRAVITY URINE: 1.01
T4 FREE SERPL-MCNC: 1.2 NG/DL
TIBC SERPL-MCNC: 182 UG/DL
TRIGL SERPL-MCNC: 58 MG/DL
TSH SERPL-ACNC: 1.8 UIU/ML
UIBC SERPL-MCNC: 136 UG/DL
UROBILINOGEN URINE: 0.2 MG/DL
VIT B12 SERPL-MCNC: 445 PG/ML

## 2024-04-16 RX ORDER — FOLIC ACID 1 MG/1
1 TABLET ORAL
Qty: 90 | Refills: 2 | Status: ACTIVE | COMMUNITY
Start: 2024-04-16 | End: 1900-01-01

## 2024-04-23 ENCOUNTER — APPOINTMENT (OUTPATIENT)
Dept: NEPHROLOGY | Facility: CLINIC | Age: 74
End: 2024-04-23
Payer: MEDICARE

## 2024-04-23 VITALS
HEART RATE: 64 BPM | SYSTOLIC BLOOD PRESSURE: 175 MMHG | HEIGHT: 69 IN | DIASTOLIC BLOOD PRESSURE: 81 MMHG | TEMPERATURE: 98.2 F | BODY MASS INDEX: 25.96 KG/M2 | OXYGEN SATURATION: 99 % | WEIGHT: 175.26 LBS

## 2024-04-23 VITALS — DIASTOLIC BLOOD PRESSURE: 68 MMHG | SYSTOLIC BLOOD PRESSURE: 162 MMHG

## 2024-04-23 DIAGNOSIS — N18.5 CHRONIC KIDNEY DISEASE, STAGE 5: ICD-10-CM

## 2024-04-23 DIAGNOSIS — N18.32 CHRONIC KIDNEY DISEASE, STAGE 3B: ICD-10-CM

## 2024-04-23 DIAGNOSIS — D63.1 CHRONIC KIDNEY DISEASE, STAGE 5: ICD-10-CM

## 2024-04-23 PROCEDURE — G2211 COMPLEX E/M VISIT ADD ON: CPT

## 2024-04-23 PROCEDURE — 99214 OFFICE O/P EST MOD 30 MIN: CPT | Mod: 25

## 2024-04-23 PROCEDURE — 96372 THER/PROPH/DIAG INJ SC/IM: CPT

## 2024-04-23 RX ORDER — FUROSEMIDE 40 MG/1
40 TABLET ORAL DAILY
Qty: 180 | Refills: 2 | Status: ACTIVE | COMMUNITY
Start: 2023-11-25 | End: 1900-01-01

## 2024-04-23 RX ORDER — FERROUS SULFATE 325(65) MG
325 (65 FE) TABLET ORAL
Qty: 90 | Refills: 3 | Status: ACTIVE | COMMUNITY
Start: 2024-04-23 | End: 1900-01-01

## 2024-04-23 RX ORDER — EMPAGLIFLOZIN 10 MG/1
10 TABLET, FILM COATED ORAL
Refills: 0 | Status: DISCONTINUED | COMMUNITY
End: 2024-04-23

## 2024-04-23 RX ORDER — PNV NO.95/FERROUS FUM/FOLIC AC 28MG-0.8MG
TABLET ORAL
Refills: 0 | Status: DISCONTINUED | COMMUNITY
Start: 2023-04-11 | End: 2024-04-23

## 2024-04-23 RX ORDER — DARBEPOETIN ALFA 40 UG/ML
40 SOLUTION INTRAVENOUS; SUBCUTANEOUS
Refills: 0 | Status: COMPLETED | OUTPATIENT
Start: 2024-04-23

## 2024-04-23 RX ORDER — ADHESIVE TAPE 3"X 2.3 YD
50 MCG TAPE, NON-MEDICATED TOPICAL
Refills: 0 | Status: DISCONTINUED | COMMUNITY
Start: 2023-04-11 | End: 2024-04-23

## 2024-04-23 RX ORDER — SODIUM BICARBONATE 650 MG/1
650 TABLET ORAL
Qty: 270 | Refills: 3 | Status: ACTIVE | COMMUNITY
Start: 2023-08-08 | End: 1900-01-01

## 2024-04-23 RX ADMIN — DARBEPOETIN ALFA 1 MCG/ML: 40 SOLUTION INTRAVENOUS; SUBCUTANEOUS at 00:00

## 2024-04-23 NOTE — HISTORY OF PRESENT ILLNESS
[FreeTextEntry1] : 73 year-old male with history of diabetes, hypertension, systolic CHF, here for follow up.  He had labs done by Dr. Eldridge last week and was encouraged to follow up for elevated creatinine.  Today pt c/o pruritic skin lesions that have developed over the past few weeks. He reports he went to a walk-in dermatology office and was given creams for the lesions as well as a skin biopsy. Still has not received a diagnosis.  He states his BLLE edema has been stable but present. C/o AYON. No orthopnea. Weight 175lbs. Follows a low Na diet. Does not fluid restrict. Has not followed up with Dr. Mireles.

## 2024-04-23 NOTE — REVIEW OF SYSTEMS
[Negative] : Heme/Lymph [As noted in HPI] : as noted in HPI [Chest Pain] : no chest pain [Lower Ext Edema] : lower extremity edema [As Noted in HPI] : as noted in HPI [Cough] : no cough [SOB on Exertion] : shortness of breath during exertion [Orthopnea] : no orthopnea [PND] : no PND [Skin Lesions] : skin lesion [de-identified] : Javi DM

## 2024-04-23 NOTE — ASSESSMENT
[FreeTextEntry1] : 73-year-old male with history of diabetes, hypertension, systolic CHF, CKD stage 4.  .  CKD4: Possibly in the setting of diabetic and hypertensive nephropathy, with component of cardiorenal syndrome from decompensated CHF. Creatinine trend reviewed with patient and trending upwards. Closer follow up recommended. Obtain kidney bladder sonogram for baseline. HTN: BP high. Increase Furosemide to 40mg BID x 3 days. Will reassess on Friday this week. Continue Amlodipine 10mg, Coreg 25mg BID and Isosorbide 30mg daily.  Weights daily. Inform office for weight gain > 2-3 pounds overnight or 5 pounds in a week. Low Na diet stressed. 1.5 Liter fluid restriction.  Proteinuria: Trend albumin creatinine ratio. Proteinuria likely in the setting of diabetic nephropathy. Cardiorenal syndrome: Recommend f/u with Dr. Mireles.  Bone metabolism: Vit D level with next labs Anemia: Aranesp 40mcg given today. Orders placed for folic acid and iron supplements.  Patient was advised to avoid chronic NSAIDs. Diabetes: A1c stable.  Follow up in 2 weeks with NP. Follow up 3 months with Dr. Bustillos.

## 2024-04-23 NOTE — PHYSICAL EXAM
[General Appearance - Alert] : alert [General Appearance - In No Acute Distress] : in no acute distress [Sclera] : the sclera and conjunctiva were normal [Outer Ear] : the ears and nose were normal in appearance [Neck Appearance] : the appearance of the neck was normal [] : no respiratory distress [Respiration, Rhythm And Depth] : normal respiratory rhythm and effort [Heart Rate And Rhythm] : heart rate was normal and rhythm regular [Heart Sounds] : normal S1 and S2 [Heart Sounds Pericardial Friction Rub] : no pericardial rub [Bowel Sounds] : normal bowel sounds [Abdomen Soft] : soft [Involuntary Movements] : no involuntary movements were seen [No Focal Deficits] : no focal deficits [Oriented To Time, Place, And Person] : oriented to person, place, and time [Affect] : the affect was normal [Mood] : the mood was normal [Decreased Breath Sounds] : decreased breath sounds [Murmurs] : no murmurs [___ +] : bilateral [unfilled]+ pretibial pitting edema [FreeTextEntry1] : plaque like rash on anterior shins bilaterally

## 2024-05-06 ENCOUNTER — APPOINTMENT (OUTPATIENT)
Dept: NEPHROLOGY | Facility: CLINIC | Age: 74
End: 2024-05-06
Payer: MEDICARE

## 2024-05-06 VITALS — DIASTOLIC BLOOD PRESSURE: 66 MMHG | SYSTOLIC BLOOD PRESSURE: 136 MMHG

## 2024-05-06 VITALS
TEMPERATURE: 97.9 F | SYSTOLIC BLOOD PRESSURE: 144 MMHG | WEIGHT: 173 LBS | BODY MASS INDEX: 25.62 KG/M2 | HEIGHT: 69 IN | DIASTOLIC BLOOD PRESSURE: 70 MMHG | HEART RATE: 67 BPM | OXYGEN SATURATION: 98 %

## 2024-05-06 DIAGNOSIS — I50.9 HEART FAILURE, UNSPECIFIED: ICD-10-CM

## 2024-05-06 DIAGNOSIS — E11.9 TYPE 2 DIABETES MELLITUS W/OUT COMPLICATIONS: ICD-10-CM

## 2024-05-06 DIAGNOSIS — E87.20 ACIDOSIS, UNSPECIFIED: ICD-10-CM

## 2024-05-06 PROCEDURE — G2211 COMPLEX E/M VISIT ADD ON: CPT

## 2024-05-06 PROCEDURE — 99213 OFFICE O/P EST LOW 20 MIN: CPT

## 2024-05-06 NOTE — PHYSICAL EXAM
[General Appearance - Alert] : alert [General Appearance - In No Acute Distress] : in no acute distress [Sclera] : the sclera and conjunctiva were normal [Outer Ear] : the ears and nose were normal in appearance [Neck Appearance] : the appearance of the neck was normal [] : no respiratory distress [Respiration, Rhythm And Depth] : normal respiratory rhythm and effort [Decreased Breath Sounds] : decreased breath sounds [Heart Rate And Rhythm] : heart rate was normal and rhythm regular [Heart Sounds] : normal S1 and S2 [Murmurs] : no murmurs [Heart Sounds Pericardial Friction Rub] : no pericardial rub [___ +] : bilateral [unfilled]+ pretibial pitting edema [Bowel Sounds] : normal bowel sounds [Abdomen Soft] : soft [Involuntary Movements] : no involuntary movements were seen [No Focal Deficits] : no focal deficits [Oriented To Time, Place, And Person] : oriented to person, place, and time [Affect] : the affect was normal [Mood] : the mood was normal [FreeTextEntry1] : plaque like rash on anterior shins bilaterally

## 2024-05-06 NOTE — HISTORY OF PRESENT ILLNESS
[FreeTextEntry1] : 73 year-old male with history of diabetes, hypertension, systolic CHF, here for follow up.  Since last visit pt mistakenly increased his Furosemide to 80mg three times daily instead of once daily.  Also started sodium bicarb 650mg 3 x daily.  He states he feels much better since increasing with less AYON and lessened BLLE swelling.  He was told his lesion were eczema and advised to start ? injections. He states the lesions have improved since his swelling has decreased.  Has not followed up with Dr. Mireles.

## 2024-05-06 NOTE — ASSESSMENT
[FreeTextEntry1] : 73-year-old male with history of diabetes, hypertension, systolic CHF, CKD stage 4.  .  CKD4: Possibly in the setting of diabetic and hypertensive nephropathy, with component of cardiorenal syndrome from decompensated CHF.  Checking renal panel today.  Obtain kidney bladder sonogram for baseline. HTN: BP improved. Continue Furosemide 40mg 2 tabs- only once daily. Continue Amlodipine 10mg, Coreg 25mg BID and Isosorbide 30mg daily.  Weights daily. Inform office for weight gain > 2-3 pounds overnight or 5 pounds in a week. Low Na diet stressed. 1.5 Liter fluid restriction.  Proteinuria: Trend albumin creatinine ratio. Proteinuria likely in the setting of diabetic nephropathy. Cardiorenal syndrome: Recommend f/u with Dr. Mireles.  Bone metabolism: Vit D level with next labs Anemia: Checking CBC today.  Patient was advised to avoid chronic NSAIDs. Diabetes: A1c stable.  Follow up to be determined based on labs.  Follow up 3 months with Dr. Bustillos-already scheduled

## 2024-05-06 NOTE — REVIEW OF SYSTEMS
[As noted in HPI] : as noted in HPI [Lower Ext Edema] : lower extremity edema [SOB on Exertion] : shortness of breath during exertion [As Noted in HPI] : as noted in HPI [Skin Lesions] : skin lesion [Negative] : Heme/Lymph [Chest Pain] : no chest pain [Cough] : no cough [Orthopnea] : no orthopnea [PND] : no PND [de-identified] : improving [de-identified] : Javi DM

## 2024-05-09 LAB
ALBUMIN SERPL ELPH-MCNC: 3.4 G/DL
ANION GAP SERPL CALC-SCNC: 12 MMOL/L
BUN SERPL-MCNC: 68 MG/DL
CALCIUM SERPL-MCNC: 8.2 MG/DL
CHLORIDE SERPL-SCNC: 107 MMOL/L
CO2 SERPL-SCNC: 24 MMOL/L
CREAT SERPL-MCNC: 3.81 MG/DL
EGFR: 16 ML/MIN/1.73M2
GLUCOSE SERPL-MCNC: 218 MG/DL
HCT VFR BLD CALC: 27.4 %
HGB BLD-MCNC: 8.3 G/DL
MCHC RBC-ENTMCNC: 30.3 GM/DL
MCHC RBC-ENTMCNC: 31.2 PG
MCV RBC AUTO: 103 FL
PHOSPHATE SERPL-MCNC: 4.8 MG/DL
PLATELET # BLD AUTO: 135 K/UL
POTASSIUM SERPL-SCNC: 5.3 MMOL/L
RBC # BLD: 2.66 M/UL
RBC # FLD: 14.4 %
SODIUM SERPL-SCNC: 142 MMOL/L
WBC # FLD AUTO: 6.89 K/UL

## 2024-05-13 ENCOUNTER — APPOINTMENT (OUTPATIENT)
Dept: NEPHROLOGY | Facility: CLINIC | Age: 74
End: 2024-05-13
Payer: MEDICARE

## 2024-05-13 VITALS
TEMPERATURE: 97.2 F | SYSTOLIC BLOOD PRESSURE: 156 MMHG | OXYGEN SATURATION: 94 % | HEIGHT: 69 IN | DIASTOLIC BLOOD PRESSURE: 56 MMHG | HEART RATE: 67 BPM

## 2024-05-13 VITALS — DIASTOLIC BLOOD PRESSURE: 72 MMHG | SYSTOLIC BLOOD PRESSURE: 138 MMHG

## 2024-05-13 DIAGNOSIS — N18.9 CHRONIC KIDNEY DISEASE, UNSPECIFIED: ICD-10-CM

## 2024-05-13 DIAGNOSIS — N18.4 CHRONIC KIDNEY DISEASE, STAGE 4 (SEVERE): ICD-10-CM

## 2024-05-13 DIAGNOSIS — D63.1 CHRONIC KIDNEY DISEASE, UNSPECIFIED: ICD-10-CM

## 2024-05-13 PROCEDURE — 96372 THER/PROPH/DIAG INJ SC/IM: CPT

## 2024-05-13 RX ORDER — DARBEPOETIN ALFA 60 UG/ML
60 SOLUTION INTRAVENOUS; SUBCUTANEOUS
Refills: 0 | Status: COMPLETED | OUTPATIENT
Start: 2024-05-13

## 2024-05-13 RX ADMIN — DARBEPOETIN ALFA 0 MCG/ML: 60 SOLUTION INTRAVENOUS; SUBCUTANEOUS at 00:00

## 2024-05-13 NOTE — ASSESSMENT
[FreeTextEntry1] : Hgb 8.3 /Hct 27.4 BP acceptable Increased dose today- Aranesp 60 mcg SQ x 1 Pt tolerated procedure well Labs in 2 weeks.

## 2024-08-02 ENCOUNTER — APPOINTMENT (OUTPATIENT)
Dept: NEPHROLOGY | Facility: CLINIC | Age: 74
End: 2024-08-02
Payer: MEDICARE

## 2024-08-02 VITALS
BODY MASS INDEX: 27.76 KG/M2 | DIASTOLIC BLOOD PRESSURE: 80 MMHG | WEIGHT: 187.39 LBS | HEIGHT: 69 IN | SYSTOLIC BLOOD PRESSURE: 170 MMHG | OXYGEN SATURATION: 98 % | HEART RATE: 61 BPM | TEMPERATURE: 98 F

## 2024-08-02 PROCEDURE — 99214 OFFICE O/P EST MOD 30 MIN: CPT

## 2024-08-02 PROCEDURE — G2211 COMPLEX E/M VISIT ADD ON: CPT

## 2024-08-02 NOTE — REVIEW OF SYSTEMS
[As noted in HPI] : as noted in HPI [Lower Ext Edema] : lower extremity edema [SOB on Exertion] : shortness of breath during exertion [As Noted in HPI] : as noted in HPI [Skin Lesions] : skin lesion [Negative] : Heme/Lymph [de-identified] : improving [de-identified] : Javi DM

## 2024-08-02 NOTE — PHYSICAL EXAM
[General Appearance - Alert] : alert [General Appearance - In No Acute Distress] : in no acute distress [Sclera] : the sclera and conjunctiva were normal [Outer Ear] : the ears and nose were normal in appearance [Neck Appearance] : the appearance of the neck was normal [] : no respiratory distress [Respiration, Rhythm And Depth] : normal respiratory rhythm and effort [Decreased Breath Sounds] : decreased breath sounds [Heart Rate And Rhythm] : heart rate was normal and rhythm regular [Heart Sounds] : normal S1 and S2 [Murmurs] : no murmurs [Heart Sounds Pericardial Friction Rub] : no pericardial rub [___ +] : bilateral [unfilled]+ pretibial pitting edema [Bowel Sounds] : normal bowel sounds [Abdomen Soft] : soft [Involuntary Movements] : no involuntary movements were seen [No Focal Deficits] : no focal deficits [Oriented To Time, Place, And Person] : oriented to person, place, and time [Affect] : the affect was normal [Mood] : the mood was normal [FreeTextEntry1] : chronic skin changes

## 2024-08-02 NOTE — ASSESSMENT
[FreeTextEntry1] : 73-year-old male with history of diabetes, hypertension, systolic CHF, CKD stage 4.  CKD4: Possibly in the setting of diabetic and hypertensive nephropathy, with component of cardiorenal syndrome from decompensated CHF.  Checking labs today and advised closer monitoring Obtain kidney bladder sonogram for baseline. Discussed with pt regarding progressive CKD and transplant eval Healthy transition team approach HTN: BP higher than goal. Continue Furosemide 40mg 2 tabs daily. Continue Amlodipine 10mg, Coreg 25mg BID and Isosorbide 30mg daily.  Low Na diet stressed. 1.5 Liter fluid restriction.  Proteinuria: Trend Proteinuria Cardiorenal syndrome: Recommend f/u with Dr. Mireles.  Bone metabolism: Vit D level with next labs Anemia: Checking CBC today. Will likely need INGRID Avoid chronic NSAIDs. Diabetes: A1c stable.  Follow up 2 mo

## 2024-08-02 NOTE — HISTORY OF PRESENT ILLNESS
[FreeTextEntry1] : 73 year-old male with history of diabetes, hypertension, systolic CHF, here for follow up CKD4 He states he doesnt feel as good as he would like He still has BLLE swelling but better.  Has not done any labs since the last injection Has not had a sono done yet Seeing Dr. Mireles next week.

## 2024-08-05 LAB
25(OH)D3 SERPL-MCNC: 10.4 NG/ML
ALBUMIN SERPL ELPH-MCNC: 3.5 G/DL
ALP BLD-CCNC: 97 U/L
ALT SERPL-CCNC: 14 U/L
ANION GAP SERPL CALC-SCNC: 16 MMOL/L
APPEARANCE: CLEAR
AST SERPL-CCNC: 13 U/L
BACTERIA: NEGATIVE /HPF
BASOPHILS # BLD AUTO: 0.04 K/UL
BASOPHILS NFR BLD AUTO: 0.6 %
BILIRUB SERPL-MCNC: 0.2 MG/DL
BILIRUBIN URINE: NEGATIVE
BLOOD URINE: NEGATIVE
BUN SERPL-MCNC: 89 MG/DL
CALCIUM SERPL-MCNC: 7.4 MG/DL
CALCIUM SERPL-MCNC: 7.4 MG/DL
CAST: 1 /LPF
CHLORIDE SERPL-SCNC: 111 MMOL/L
CHOLEST SERPL-MCNC: 100 MG/DL
CO2 SERPL-SCNC: 18 MMOL/L
COLOR: YELLOW
CREAT SERPL-MCNC: 5.22 MG/DL
CREAT SPEC-SCNC: 38 MG/DL
CREAT/PROT UR: 6.2 RATIO
EGFR: 11 ML/MIN/1.73M2
EOSINOPHIL # BLD AUTO: 0.3 K/UL
EOSINOPHIL NFR BLD AUTO: 4.8 %
EPITHELIAL CELLS: 0 /HPF
ESTIMATED AVERAGE GLUCOSE: 140 MG/DL
FERRITIN SERPL-MCNC: 362 NG/ML
FOLATE SERPL-MCNC: >20 NG/ML
GLUCOSE QUALITATIVE U: 100 MG/DL
GLUCOSE SERPL-MCNC: 185 MG/DL
HBA1C MFR BLD HPLC: 6.5 %
HCT VFR BLD CALC: 24.4 %
HDLC SERPL-MCNC: 39 MG/DL
HGB BLD-MCNC: 7.3 G/DL
IMM GRANULOCYTES NFR BLD AUTO: 0.3 %
IRON SATN MFR SERPL: 37 %
IRON SERPL-MCNC: 76 UG/DL
KETONES URINE: NEGATIVE MG/DL
LDLC SERPL CALC-MCNC: 50 MG/DL
LEUKOCYTE ESTERASE URINE: NEGATIVE
LYMPHOCYTES # BLD AUTO: 0.85 K/UL
LYMPHOCYTES NFR BLD AUTO: 13.5 %
MAGNESIUM SERPL-MCNC: 1.9 MG/DL
MAN DIFF?: NORMAL
MCHC RBC-ENTMCNC: 29.9 GM/DL
MCHC RBC-ENTMCNC: 30.2 PG
MCV RBC AUTO: 100.8 FL
MICROSCOPIC-UA: NORMAL
MONOCYTES # BLD AUTO: 0.38 K/UL
MONOCYTES NFR BLD AUTO: 6 %
NEUTROPHILS # BLD AUTO: 4.71 K/UL
NEUTROPHILS NFR BLD AUTO: 74.8 %
NITRITE URINE: NEGATIVE
NONHDLC SERPL-MCNC: 62 MG/DL
PARATHYROID HORMONE INTACT: 228 PG/ML
PH URINE: 7
PHOSPHATE SERPL-MCNC: 5.2 MG/DL
PLATELET # BLD AUTO: 118 K/UL
POTASSIUM SERPL-SCNC: 5.9 MMOL/L
PROT SERPL-MCNC: 5.5 G/DL
PROT UR-MCNC: 234 MG/DL
PROTEIN URINE: 300 MG/DL
RBC # BLD: 2.42 M/UL
RBC # FLD: 14.5 %
RED BLOOD CELLS URINE: 1 /HPF
SODIUM SERPL-SCNC: 145 MMOL/L
SPECIFIC GRAVITY URINE: 1.01
TIBC SERPL-MCNC: 207 UG/DL
TRIGL SERPL-MCNC: 48 MG/DL
UIBC SERPL-MCNC: 131 UG/DL
URATE SERPL-MCNC: 8.3 MG/DL
UROBILINOGEN URINE: 0.2 MG/DL
VIT B12 SERPL-MCNC: 623 PG/ML
WBC # FLD AUTO: 6.3 K/UL
WHITE BLOOD CELLS URINE: 0 /HPF

## 2024-08-06 ENCOUNTER — APPOINTMENT (OUTPATIENT)
Dept: NEPHROLOGY | Facility: CLINIC | Age: 74
End: 2024-08-06

## 2024-08-06 PROBLEM — E55.9 VITAMIN D DEFICIENCY: Status: ACTIVE | Noted: 2024-08-06

## 2024-08-06 PROCEDURE — 99213 OFFICE O/P EST LOW 20 MIN: CPT | Mod: 25

## 2024-08-06 PROCEDURE — 96372 THER/PROPH/DIAG INJ SC/IM: CPT

## 2024-08-06 NOTE — PHYSICAL EXAM
[General Appearance - Alert] : alert [General Appearance - In No Acute Distress] : in no acute distress [Sclera] : the sclera and conjunctiva were normal [Outer Ear] : the ears and nose were normal in appearance [Neck Appearance] : the appearance of the neck was normal [] : no respiratory distress [Respiration, Rhythm And Depth] : normal respiratory rhythm and effort [Heart Rate And Rhythm] : heart rate was normal and rhythm regular [Heart Sounds] : normal S1 and S2 [Murmurs] : no murmurs [Heart Sounds Pericardial Friction Rub] : no pericardial rub [___ +] : bilateral [unfilled]+ pretibial pitting edema [Bowel Sounds] : normal bowel sounds [Abdomen Soft] : soft [Involuntary Movements] : no involuntary movements were seen [FreeTextEntry1] : chronic skin changes [No Focal Deficits] : no focal deficits [Oriented To Time, Place, And Person] : oriented to person, place, and time [Affect] : the affect was normal [Mood] : the mood was normal

## 2024-08-06 NOTE — HISTORY OF PRESENT ILLNESS
[FreeTextEntry1] : 73 year-old male with history of diabetes, hypertension, systolic CHF, here for follow up for his anemia and worsening CKD He has been taking alleve Has not had a sono done yet Has not scheduled appt with Dr Mireles

## 2024-08-06 NOTE — ASSESSMENT
[FreeTextEntry1] : 73-year-old male with history of diabetes, hypertension, systolic CHF, CKD stage 4.  CKD4: Possibly in the setting of diabetic and hypertensive nephropathy, with component of cardiorenal syndrome from decompensated CHF. CRISPIN on CKD in setting of NSAIDs- he stopped the NSAIDs Obtain kidney bladder sonogram for baseline. Discussed with pt regarding progressive CKD and transplant eval Healthy transition team approach HTN: BP improved. Continue Furosemide 40mg 2 tabs daily. Continue Amlodipine 10mg, Coreg 25mg BID and Isosorbide 30mg daily.  Low Na diet stressed. 1.5 Liter fluid restriction.  Hyperkalemia: stop drinking OJ and taking NSAIDs- Lokelma was cost prohibitive- samples given take one today and one tomorrow Repeat labs in 2 weeks Proteinuria: Worsening proteinuria - possibly in setting of NSAIDs Cardiorenal syndrome: Recommend f/u with Dr. Mireles.  Bone metabolism: Take ergo Vit D weekly Anemia: severely anemic - given aranesp 100 today and repeat labs in 2 weeks Avoid chronic NSAIDs. Diabetes: A1c stable.  Follow up 2 mo with me but will likely need to come in for Aranesp in 2 weeks  Nisreen  nurse present for exam and aranesp given.

## 2024-08-08 LAB
M TB IFN-G BLD-IMP: ABNORMAL
QUANTIFERON TB PLUS MITOGEN MINUS NIL: 0.26 IU/ML
QUANTIFERON TB PLUS NIL: 0.02 IU/ML
QUANTIFERON TB PLUS TB1 MINUS NIL: 0 IU/ML
QUANTIFERON TB PLUS TB2 MINUS NIL: 0 IU/ML

## 2024-08-09 ENCOUNTER — RX RENEWAL (OUTPATIENT)
Age: 74
End: 2024-08-09

## 2024-08-26 ENCOUNTER — APPOINTMENT (OUTPATIENT)
Dept: ULTRASOUND IMAGING | Facility: CLINIC | Age: 74
End: 2024-08-26
Payer: MEDICARE

## 2024-08-26 PROCEDURE — 76770 US EXAM ABDO BACK WALL COMP: CPT

## 2024-09-03 ENCOUNTER — APPOINTMENT (OUTPATIENT)
Dept: NEPHROLOGY | Facility: CLINIC | Age: 74
End: 2024-09-03
Payer: MEDICARE

## 2024-09-03 VITALS — DIASTOLIC BLOOD PRESSURE: 97 MMHG | HEART RATE: 57 BPM | TEMPERATURE: 97.7 F | SYSTOLIC BLOOD PRESSURE: 160 MMHG

## 2024-09-03 DIAGNOSIS — D63.1 CHRONIC KIDNEY DISEASE, UNSPECIFIED: ICD-10-CM

## 2024-09-03 DIAGNOSIS — N18.9 CHRONIC KIDNEY DISEASE, UNSPECIFIED: ICD-10-CM

## 2024-09-03 DIAGNOSIS — N18.4 CHRONIC KIDNEY DISEASE, STAGE 4 (SEVERE): ICD-10-CM

## 2024-09-03 DIAGNOSIS — E83.39 OTHER DISORDERS OF PHOSPHORUS METABOLISM: ICD-10-CM

## 2024-09-03 PROCEDURE — 96372 THER/PROPH/DIAG INJ SC/IM: CPT

## 2024-09-03 RX ORDER — CALCIUM ACETATE 667 MG
667 TABLET ORAL
Qty: 270 | Refills: 3 | Status: ACTIVE | COMMUNITY
Start: 2024-09-03 | End: 1900-01-01

## 2024-09-03 RX ORDER — DARBEPOETIN ALFA 100 UG/ML
100 SOLUTION INTRAVENOUS; SUBCUTANEOUS
Qty: 0 | Refills: 0 | Status: COMPLETED | OUTPATIENT
Start: 2024-09-03

## 2024-09-09 ENCOUNTER — RX RENEWAL (OUTPATIENT)
Age: 74
End: 2024-09-09

## 2024-09-09 RX ORDER — ERGOCALCIFEROL 1.25 MG/1
1.25 MG CAPSULE, LIQUID FILLED ORAL
Qty: 12 | Refills: 0 | Status: ACTIVE | COMMUNITY
Start: 2024-09-09 | End: 1900-01-01

## 2024-10-07 ENCOUNTER — APPOINTMENT (OUTPATIENT)
Dept: NEPHROLOGY | Facility: CLINIC | Age: 74
End: 2024-10-07
Payer: MEDICARE

## 2024-10-07 VITALS — DIASTOLIC BLOOD PRESSURE: 80 MMHG | SYSTOLIC BLOOD PRESSURE: 208 MMHG | HEART RATE: 62 BPM

## 2024-10-07 DIAGNOSIS — E87.5 HYPERKALEMIA: ICD-10-CM

## 2024-10-07 PROCEDURE — 99215 OFFICE O/P EST HI 40 MIN: CPT

## 2024-10-07 PROCEDURE — G2211 COMPLEX E/M VISIT ADD ON: CPT

## 2024-10-07 RX ORDER — ATORVASTATIN CALCIUM 80 MG/1
80 TABLET, FILM COATED ORAL
Qty: 90 | Refills: 0 | Status: ACTIVE | COMMUNITY
Start: 2024-07-04

## 2024-10-07 RX ORDER — CLOBETASOL PROPIONATE 0.5 MG/G
0.05 CREAM TOPICAL
Qty: 120 | Refills: 0 | Status: DISCONTINUED | COMMUNITY
Start: 2024-06-12

## 2024-10-07 RX ORDER — CARVEDILOL 25 MG/1
25 TABLET, FILM COATED ORAL TWICE DAILY
Qty: 180 | Refills: 3 | Status: ACTIVE | COMMUNITY
Start: 2024-07-08

## 2024-10-07 RX ORDER — DOXAZOSIN 2 MG/1
2 TABLET ORAL
Qty: 90 | Refills: 3 | Status: ACTIVE | COMMUNITY
Start: 2024-10-07 | End: 1900-01-01

## 2024-10-10 ENCOUNTER — APPOINTMENT (OUTPATIENT)
Dept: NEPHROLOGY | Facility: CLINIC | Age: 74
End: 2024-10-10
Payer: MEDICARE

## 2024-10-10 VITALS — DIASTOLIC BLOOD PRESSURE: 60 MMHG | SYSTOLIC BLOOD PRESSURE: 160 MMHG

## 2024-10-10 DIAGNOSIS — D63.1 CHRONIC KIDNEY DISEASE, UNSPECIFIED: ICD-10-CM

## 2024-10-10 DIAGNOSIS — N18.4 CHRONIC KIDNEY DISEASE, STAGE 4 (SEVERE): ICD-10-CM

## 2024-10-10 DIAGNOSIS — I10 ESSENTIAL (PRIMARY) HYPERTENSION: ICD-10-CM

## 2024-10-10 DIAGNOSIS — N18.9 CHRONIC KIDNEY DISEASE, UNSPECIFIED: ICD-10-CM

## 2024-10-10 PROCEDURE — 96372 THER/PROPH/DIAG INJ SC/IM: CPT

## 2024-10-10 RX ORDER — DARBEPOETIN ALFA 100 UG/ML
100 SOLUTION INTRAVENOUS; SUBCUTANEOUS
Qty: 0 | Refills: 0 | Status: COMPLETED | OUTPATIENT
Start: 2024-10-10

## 2024-10-10 RX ADMIN — DARBEPOETIN ALFA 0 MCG/ML: 100 SOLUTION INTRAVENOUS; SUBCUTANEOUS at 00:00

## 2024-10-31 ENCOUNTER — APPOINTMENT (OUTPATIENT)
Dept: NEPHROLOGY | Facility: CLINIC | Age: 74
End: 2024-10-31
Payer: MEDICARE

## 2024-10-31 VITALS
HEIGHT: 69 IN | HEART RATE: 64 BPM | BODY MASS INDEX: 27.25 KG/M2 | WEIGHT: 184 LBS | DIASTOLIC BLOOD PRESSURE: 69 MMHG | TEMPERATURE: 97.3 F | OXYGEN SATURATION: 95 % | SYSTOLIC BLOOD PRESSURE: 160 MMHG

## 2024-10-31 DIAGNOSIS — D63.1 CHRONIC KIDNEY DISEASE, UNSPECIFIED: ICD-10-CM

## 2024-10-31 DIAGNOSIS — N18.9 CHRONIC KIDNEY DISEASE, UNSPECIFIED: ICD-10-CM

## 2024-10-31 DIAGNOSIS — N18.4 CHRONIC KIDNEY DISEASE, STAGE 4 (SEVERE): ICD-10-CM

## 2024-10-31 PROCEDURE — 96372 THER/PROPH/DIAG INJ SC/IM: CPT

## 2024-10-31 RX ORDER — DARBEPOETIN ALFA 100 UG/ML
100 SOLUTION INTRAVENOUS; SUBCUTANEOUS
Qty: 0 | Refills: 0 | Status: COMPLETED | OUTPATIENT
Start: 2024-10-31

## 2024-10-31 RX ADMIN — DARBEPOETIN ALFA 0 MCG/ML: 100 SOLUTION INTRAVENOUS; SUBCUTANEOUS at 00:00

## 2024-11-13 ENCOUNTER — APPOINTMENT (OUTPATIENT)
Dept: TRANSPLANT | Facility: CLINIC | Age: 74
End: 2024-11-13

## 2024-11-13 ENCOUNTER — APPOINTMENT (OUTPATIENT)
Dept: NEPHROLOGY | Facility: CLINIC | Age: 74
End: 2024-11-13

## 2024-11-13 DIAGNOSIS — N18.4 CHRONIC KIDNEY DISEASE, STAGE 4 (SEVERE): ICD-10-CM

## 2024-11-21 ENCOUNTER — NON-APPOINTMENT (OUTPATIENT)
Age: 74
End: 2024-11-21

## 2024-11-27 ENCOUNTER — NON-APPOINTMENT (OUTPATIENT)
Age: 74
End: 2024-11-27

## 2024-12-14 ENCOUNTER — OUTPATIENT (OUTPATIENT)
Dept: OUTPATIENT SERVICES | Facility: HOSPITAL | Age: 74
LOS: 1 days | End: 2024-12-14
Payer: MEDICARE

## 2024-12-14 ENCOUNTER — APPOINTMENT (OUTPATIENT)
Dept: RADIOLOGY | Facility: HOSPITAL | Age: 74
End: 2024-12-14

## 2024-12-14 DIAGNOSIS — Z00.8 ENCOUNTER FOR OTHER GENERAL EXAMINATION: ICD-10-CM

## 2024-12-14 PROCEDURE — 71046 X-RAY EXAM CHEST 2 VIEWS: CPT | Mod: 26

## 2024-12-14 PROCEDURE — 71046 X-RAY EXAM CHEST 2 VIEWS: CPT

## 2025-01-21 ENCOUNTER — APPOINTMENT (OUTPATIENT)
Dept: TRANSPLANT | Facility: CLINIC | Age: 75
End: 2025-01-21

## 2025-01-21 ENCOUNTER — APPOINTMENT (OUTPATIENT)
Dept: NEPHROLOGY | Facility: CLINIC | Age: 75
End: 2025-01-21

## 2025-04-25 PROBLEM — N18.6 ESRD (END STAGE RENAL DISEASE) ON DIALYSIS: Status: ACTIVE | Noted: 2025-04-25

## 2025-04-25 RX ORDER — ASCORBIC ACID, THIAMINE MONONITRATE,RIBOFLAVIN, NIACINAMIDE, PYRIDOXINE HYDROCHLORIDE, FOLIC ACID, CYANOCOBALAMIN, BIOTIN, CALCIUM PANTOTHENATE, 100; 1.5; 1.7; 20; 10; 1; 6000; 150000; 5 MG/1; MG/1; MG/1; MG/1; MG/1; MG/1; UG/1; UG/1; MG/1
1 CAPSULE, LIQUID FILLED ORAL
Qty: 90 | Refills: 3 | Status: ACTIVE | COMMUNITY
Start: 2025-04-25 | End: 1900-01-01

## 2025-04-29 ENCOUNTER — NON-APPOINTMENT (OUTPATIENT)
Age: 75
End: 2025-04-29

## 2025-05-02 ENCOUNTER — APPOINTMENT (OUTPATIENT)
Dept: INTERNAL MEDICINE | Facility: CLINIC | Age: 75
End: 2025-05-02
Payer: MEDICARE

## 2025-05-02 VITALS — BODY MASS INDEX: 26.29 KG/M2 | DIASTOLIC BLOOD PRESSURE: 50 MMHG | WEIGHT: 178 LBS | SYSTOLIC BLOOD PRESSURE: 100 MMHG

## 2025-05-02 DIAGNOSIS — T78.40XA ALLERGY, UNSPECIFIED, INITIAL ENCOUNTER: ICD-10-CM

## 2025-05-02 DIAGNOSIS — I69.359 HEMIPLEGIA AND HEMIPARESIS FOLLOWING CEREBRAL INFARCTION AFFECTING UNSPECIFIED SIDE: ICD-10-CM

## 2025-05-02 DIAGNOSIS — N18.6 END STAGE RENAL DISEASE: ICD-10-CM

## 2025-05-02 DIAGNOSIS — F32.A ANXIETY DISORDER, UNSPECIFIED: ICD-10-CM

## 2025-05-02 DIAGNOSIS — Z99.2 END STAGE RENAL DISEASE: ICD-10-CM

## 2025-05-02 DIAGNOSIS — F41.9 ANXIETY DISORDER, UNSPECIFIED: ICD-10-CM

## 2025-05-02 PROCEDURE — G2211 COMPLEX E/M VISIT ADD ON: CPT

## 2025-05-02 PROCEDURE — 99214 OFFICE O/P EST MOD 30 MIN: CPT

## 2025-05-02 RX ORDER — METOPROLOL SUCCINATE 50 MG/1
50 TABLET, EXTENDED RELEASE ORAL
Qty: 30 | Refills: 0 | Status: ACTIVE | COMMUNITY
Start: 2025-05-02

## 2025-05-02 RX ORDER — ESCITALOPRAM OXALATE 10 MG/1
10 TABLET ORAL
Qty: 30 | Refills: 0 | Status: ACTIVE | COMMUNITY
Start: 2025-05-02 | End: 1900-01-01

## 2025-05-02 RX ORDER — LOSARTAN POTASSIUM 50 MG/1
50 TABLET, FILM COATED ORAL
Refills: 0 | Status: ACTIVE | COMMUNITY
Start: 2025-05-02

## 2025-05-02 RX ORDER — AMIODARONE HYDROCHLORIDE 200 MG/1
200 TABLET ORAL
Qty: 90 | Refills: 0 | Status: ACTIVE | COMMUNITY
Start: 2025-05-02

## 2025-05-03 PROBLEM — F41.9 ANXIETY AND DEPRESSION: Status: ACTIVE | Noted: 2025-05-02

## 2025-05-03 PROBLEM — T78.40XA ALLERGIC RASH PRESENT ON EXAMINATION: Status: RESOLVED | Noted: 2024-04-09 | Resolved: 2025-05-03

## 2025-05-03 PROBLEM — I69.359: Status: ACTIVE | Noted: 2025-05-02

## 2025-05-12 DIAGNOSIS — G47.00 INSOMNIA, UNSPECIFIED: ICD-10-CM

## 2025-05-12 RX ORDER — TRAZODONE HYDROCHLORIDE 50 MG/1
50 TABLET ORAL
Qty: 30 | Refills: 0 | Status: ACTIVE | COMMUNITY
Start: 2025-05-12 | End: 1900-01-01

## 2025-05-24 ENCOUNTER — RX RENEWAL (OUTPATIENT)
Age: 75
End: 2025-05-24

## 2025-06-04 ENCOUNTER — RX RENEWAL (OUTPATIENT)
Age: 75
End: 2025-06-04

## 2025-07-01 ENCOUNTER — APPOINTMENT (OUTPATIENT)
Dept: INTERNAL MEDICINE | Facility: CLINIC | Age: 75
End: 2025-07-01
Payer: MEDICARE

## 2025-07-01 VITALS
HEIGHT: 69 IN | BODY MASS INDEX: 24.73 KG/M2 | SYSTOLIC BLOOD PRESSURE: 120 MMHG | TEMPERATURE: 97.6 F | WEIGHT: 167 LBS | DIASTOLIC BLOOD PRESSURE: 70 MMHG

## 2025-07-01 PROCEDURE — 99214 OFFICE O/P EST MOD 30 MIN: CPT

## 2025-07-01 PROCEDURE — G2211 COMPLEX E/M VISIT ADD ON: CPT

## 2025-07-08 ENCOUNTER — NON-APPOINTMENT (OUTPATIENT)
Age: 75
End: 2025-07-08

## 2025-07-08 ENCOUNTER — APPOINTMENT (OUTPATIENT)
Dept: INFECTIOUS DISEASE | Facility: CLINIC | Age: 75
End: 2025-07-08
Payer: MEDICARE

## 2025-07-08 VITALS
WEIGHT: 143 LBS | OXYGEN SATURATION: 96 % | BODY MASS INDEX: 21.18 KG/M2 | HEART RATE: 78 BPM | TEMPERATURE: 98.3 F | HEIGHT: 69 IN | DIASTOLIC BLOOD PRESSURE: 67 MMHG | SYSTOLIC BLOOD PRESSURE: 136 MMHG

## 2025-07-08 PROBLEM — L08.9 TOE INFECTION: Status: ACTIVE | Noted: 2025-07-08

## 2025-07-08 PROBLEM — A49.8 PSEUDOMONAS AERUGINOSA INFECTION: Status: ACTIVE | Noted: 2025-07-08

## 2025-07-08 PROCEDURE — 99204 OFFICE O/P NEW MOD 45 MIN: CPT

## 2025-07-09 LAB
CRP SERPL-MCNC: <3 MG/L
ERYTHROCYTE [SEDIMENTATION RATE] IN BLOOD BY WESTERGREN METHOD: 3 MM/HR

## 2025-07-15 ENCOUNTER — OUTPATIENT (OUTPATIENT)
Dept: OUTPATIENT SERVICES | Facility: HOSPITAL | Age: 75
LOS: 1 days | End: 2025-07-15
Payer: MEDICARE

## 2025-07-15 ENCOUNTER — APPOINTMENT (OUTPATIENT)
Dept: MRI IMAGING | Facility: CLINIC | Age: 75
End: 2025-07-15
Payer: MEDICARE

## 2025-07-15 DIAGNOSIS — L08.9 LOCAL INFECTION OF THE SKIN AND SUBCUTANEOUS TISSUE, UNSPECIFIED: ICD-10-CM

## 2025-07-15 PROCEDURE — 73720 MRI LWR EXTREMITY W/O&W/DYE: CPT

## 2025-07-15 PROCEDURE — A9585: CPT

## 2025-07-15 PROCEDURE — 73720 MRI LWR EXTREMITY W/O&W/DYE: CPT | Mod: 26,LT

## 2025-07-21 ENCOUNTER — NON-APPOINTMENT (OUTPATIENT)
Age: 75
End: 2025-07-21

## 2025-08-11 ENCOUNTER — TRANSCRIPTION ENCOUNTER (OUTPATIENT)
Age: 75
End: 2025-08-11

## 2025-08-13 ENCOUNTER — RX RENEWAL (OUTPATIENT)
Age: 75
End: 2025-08-13

## 2025-08-14 ENCOUNTER — RX RENEWAL (OUTPATIENT)
Age: 75
End: 2025-08-14

## 2025-09-02 ENCOUNTER — APPOINTMENT (OUTPATIENT)
Dept: INTERNAL MEDICINE | Facility: CLINIC | Age: 75
End: 2025-09-02
Payer: MEDICARE

## 2025-09-02 VITALS
TEMPERATURE: 98.1 F | SYSTOLIC BLOOD PRESSURE: 120 MMHG | DIASTOLIC BLOOD PRESSURE: 76 MMHG | BODY MASS INDEX: 21.18 KG/M2 | WEIGHT: 143 LBS | HEIGHT: 69 IN

## 2025-09-02 DIAGNOSIS — F32.A ANXIETY DISORDER, UNSPECIFIED: ICD-10-CM

## 2025-09-02 DIAGNOSIS — E11.628 TYPE 2 DIABETES MELLITUS WITH OTHER SKIN COMPLICATIONS: ICD-10-CM

## 2025-09-02 DIAGNOSIS — R94.31 ABNORMAL ELECTROCARDIOGRAM [ECG] [EKG]: ICD-10-CM

## 2025-09-02 DIAGNOSIS — L08.9 TYPE 2 DIABETES MELLITUS WITH OTHER SKIN COMPLICATIONS: ICD-10-CM

## 2025-09-02 DIAGNOSIS — F41.9 ANXIETY DISORDER, UNSPECIFIED: ICD-10-CM

## 2025-09-02 PROCEDURE — 99214 OFFICE O/P EST MOD 30 MIN: CPT

## 2025-09-02 PROCEDURE — G2211 COMPLEX E/M VISIT ADD ON: CPT

## 2025-09-02 RX ORDER — CLOPIDOGREL BISULFATE 75 MG/1
75 TABLET, FILM COATED ORAL
Qty: 30 | Refills: 0 | Status: ACTIVE | COMMUNITY
Start: 2025-09-02

## 2025-09-02 RX ORDER — DOXYCYCLINE 100 MG/1
100 CAPSULE ORAL
Qty: 20 | Refills: 0 | Status: ACTIVE | COMMUNITY
Start: 2025-09-02 | End: 1900-01-01

## 2025-09-02 RX ORDER — CIPROFLOXACIN HYDROCHLORIDE 500 MG/1
500 TABLET, FILM COATED ORAL DAILY
Qty: 10 | Refills: 0 | Status: ACTIVE | COMMUNITY
Start: 2025-09-02 | End: 1900-01-01

## 2025-09-05 PROBLEM — R94.31 ABNORMAL QT INTERVAL PRESENT ON ELECTROCARDIOGRAM: Status: ACTIVE | Noted: 2025-09-05

## 2025-09-05 PROBLEM — E11.628 DIABETIC INFECTION OF LEFT FOOT: Status: ACTIVE | Noted: 2025-09-02

## 2025-09-05 RX ORDER — ESCITALOPRAM OXALATE 10 MG/1
10 TABLET ORAL
Qty: 90 | Refills: 1 | Status: ACTIVE | COMMUNITY
Start: 2025-09-05

## 2025-09-15 RX ORDER — SERTRALINE HYDROCHLORIDE 50 MG/1
50 TABLET, FILM COATED ORAL
Qty: 90 | Refills: 0 | Status: ACTIVE | COMMUNITY
Start: 2025-09-15 | End: 1900-01-01

## 2025-09-24 PROBLEM — Z79.2 LONG TERM CURRENT USE OF ANTIBIOTICS: Status: ACTIVE | Noted: 2025-09-24

## 2025-09-24 PROBLEM — M86.10 ACUTE OSTEOMYELITIS: Status: ACTIVE | Noted: 2025-09-24

## 2025-09-24 PROBLEM — Z51.81 MEDICATION MONITORING ENCOUNTER: Status: ACTIVE | Noted: 2025-09-24
